# Patient Record
Sex: FEMALE | Race: WHITE
[De-identification: names, ages, dates, MRNs, and addresses within clinical notes are randomized per-mention and may not be internally consistent; named-entity substitution may affect disease eponyms.]

---

## 2018-04-26 ENCOUNTER — HOSPITAL ENCOUNTER (OUTPATIENT)
Dept: HOSPITAL 75 - RAD | Age: 28
End: 2018-04-26
Attending: OBSTETRICS & GYNECOLOGY
Payer: COMMERCIAL

## 2018-04-26 DIAGNOSIS — N83.202: Primary | ICD-10-CM

## 2018-04-26 PROCEDURE — 76830 TRANSVAGINAL US NON-OB: CPT

## 2018-04-26 PROCEDURE — 76856 US EXAM PELVIC COMPLETE: CPT

## 2018-04-26 NOTE — DIAGNOSTIC IMAGING REPORT
INDICATION: Pelvic pain and abnormal uterine bleeding.



TECHNIQUE: Transabdominal and transvaginal pelvic sonography was

performed.



FINDINGS: Uterus measures 7.3 x 4.7 x 4.1 cm. The endometrium is

3 mm in thickness. No uterine mass is seen. The right ovary

measures 2.5 x 2.0 x 1.5 cm. The left ovary measures 3.8 x 3.6 x

2.9 cm. There is a 3.0 cm cyst involving the left ovary. No free

fluid is seen.



IMPRESSION: 3 cm simple left ovarian cyst. The study is otherwise

unremarkable.



Dictated by: 



  Dictated on workstation # [id number]

## 2019-01-16 ENCOUNTER — HOSPITAL ENCOUNTER (OUTPATIENT)
Dept: HOSPITAL 75 - RAD | Age: 29
End: 2019-01-16
Attending: OBSTETRICS & GYNECOLOGY
Payer: OTHER GOVERNMENT

## 2019-01-16 DIAGNOSIS — N83.202: Primary | ICD-10-CM

## 2019-01-16 PROCEDURE — 76856 US EXAM PELVIC COMPLETE: CPT

## 2019-01-16 PROCEDURE — 76830 TRANSVAGINAL US NON-OB: CPT

## 2019-01-16 NOTE — DIAGNOSTIC IMAGING REPORT
PROCEDURE: US Non-ob pelvis comp/trans.



TECHNIQUE: Multiple realtime grayscale images were obtained of

the pelvis in various projections endovaginally.



Transabdominal imaging was also performed.



INDICATION: Left lower quadrant mass.



COMPARISON: Correlation is made with prior pelvic ultrasound from

04/26/2018.



FINDINGS: The uterus measures 7.7 x 5.2 x 4.5 cm. Endometrium is

3 mm in thickness. The right ovary was not visualized. The left

ovary measures 3.6 x 3.5 x 2.7 cm. There is a cyst in the left

ovary measuring 3.5 x 2.7 x 1.4 cm. Previous ovarian cyst

measured approximately 3.0 x 2.8 cm. No other adnexal mass or

free fluid is seen.



IMPRESSION: Left ovarian cyst, similar to perhaps minimally

larger when compared with exam from April. No new abnormality is

seen.



Dictated by: 



  Dictated on workstation # [id number]

## 2019-02-11 ENCOUNTER — HOSPITAL ENCOUNTER (OUTPATIENT)
Dept: HOSPITAL 75 - PREOP | Age: 29
Discharge: HOME | End: 2019-02-11
Attending: OBSTETRICS & GYNECOLOGY
Payer: OTHER GOVERNMENT

## 2019-02-11 VITALS — BODY MASS INDEX: 34.2 KG/M2 | WEIGHT: 193 LBS | HEIGHT: 63 IN

## 2019-02-11 DIAGNOSIS — Z01.818: Primary | ICD-10-CM

## 2019-02-14 ENCOUNTER — HOSPITAL ENCOUNTER (OUTPATIENT)
Dept: HOSPITAL 75 - SDC | Age: 29
Discharge: HOME | End: 2019-02-14
Attending: OBSTETRICS & GYNECOLOGY
Payer: OTHER GOVERNMENT

## 2019-02-14 VITALS — HEIGHT: 63 IN | BODY MASS INDEX: 34.2 KG/M2 | WEIGHT: 193 LBS

## 2019-02-14 VITALS — DIASTOLIC BLOOD PRESSURE: 73 MMHG | SYSTOLIC BLOOD PRESSURE: 106 MMHG

## 2019-02-14 VITALS — DIASTOLIC BLOOD PRESSURE: 72 MMHG | SYSTOLIC BLOOD PRESSURE: 110 MMHG

## 2019-02-14 VITALS — SYSTOLIC BLOOD PRESSURE: 101 MMHG | DIASTOLIC BLOOD PRESSURE: 70 MMHG

## 2019-02-14 VITALS — SYSTOLIC BLOOD PRESSURE: 109 MMHG | DIASTOLIC BLOOD PRESSURE: 82 MMHG

## 2019-02-14 DIAGNOSIS — N83.292: Primary | ICD-10-CM

## 2019-02-14 DIAGNOSIS — J45.909: ICD-10-CM

## 2019-02-14 DIAGNOSIS — R10.2: ICD-10-CM

## 2019-02-14 DIAGNOSIS — E66.9: ICD-10-CM

## 2019-02-14 DIAGNOSIS — F17.210: ICD-10-CM

## 2019-02-14 LAB
BASOPHILS # BLD AUTO: 0 10^3/UL (ref 0–0.1)
BASOPHILS NFR BLD AUTO: 0 % (ref 0–10)
EOSINOPHIL # BLD AUTO: 0.2 10^3/UL (ref 0–0.3)
EOSINOPHIL NFR BLD AUTO: 2 % (ref 0–10)
ERYTHROCYTE [DISTWIDTH] IN BLOOD BY AUTOMATED COUNT: 12.9 % (ref 10–14.5)
HCT VFR BLD CALC: 39 % (ref 35–52)
HGB BLD-MCNC: 12.9 G/DL (ref 11.5–16)
LYMPHOCYTES # BLD AUTO: 3 X 10^3 (ref 1–4)
LYMPHOCYTES NFR BLD AUTO: 35 % (ref 12–44)
MANUAL DIFFERENTIAL PERFORMED BLD QL: NO
MCH RBC QN AUTO: 32 PG (ref 25–34)
MCHC RBC AUTO-ENTMCNC: 33 G/DL (ref 32–36)
MCV RBC AUTO: 95 FL (ref 80–99)
MONOCYTES # BLD AUTO: 0.6 X 10^3 (ref 0–1)
MONOCYTES NFR BLD AUTO: 7 % (ref 0–12)
NEUTROPHILS # BLD AUTO: 4.8 X 10^3 (ref 1.8–7.8)
NEUTROPHILS NFR BLD AUTO: 56 % (ref 42–75)
PLATELET # BLD: 211 10^3/UL (ref 130–400)
PMV BLD AUTO: 12 FL (ref 7.4–10.4)
WBC # BLD AUTO: 8.6 10^3/UL (ref 4.3–11)

## 2019-02-14 PROCEDURE — 86900 BLOOD TYPING SEROLOGIC ABO: CPT

## 2019-02-14 PROCEDURE — 85025 COMPLETE CBC W/AUTO DIFF WBC: CPT

## 2019-02-14 PROCEDURE — 94664 DEMO&/EVAL PT USE INHALER: CPT

## 2019-02-14 PROCEDURE — 86901 BLOOD TYPING SEROLOGIC RH(D): CPT

## 2019-02-14 PROCEDURE — 84703 CHORIONIC GONADOTROPIN ASSAY: CPT

## 2019-02-14 PROCEDURE — 86850 RBC ANTIBODY SCREEN: CPT

## 2019-02-14 PROCEDURE — 87081 CULTURE SCREEN ONLY: CPT

## 2019-02-14 PROCEDURE — 36415 COLL VENOUS BLD VENIPUNCTURE: CPT

## 2019-02-14 NOTE — XMS REPORT
Continuity of Care Document

 Created on: 2019



ERIC BEAL

External Reference #: [id number]

: 1990

Sex: Female



Demographics







 Address  [address]  [address]

 

 Home Phone  [phone] x

 

 Preferred Language  Unknown

 

 Marital Status  Unknown

 

 Yazidism Affiliation  Unknown

 

 Race  Unknown

 

 Ethnic Group  Unknown





Author







 Author  [facility]

 

 Address  Unknown

 

 Phone  Unavailable



              



Allergies

      





 Active            Description            Code            Type            
Severity            Reaction            Onset            Reported/Identified   
         Relationship to Patient            Clinical Status        

 

 Yes            No Known Drug Allergies            [id number]            Drug 
Allergy            Unknown            N/A                         07/10/2010   
                               



                  



Medications

      



There is no data.                  



Problems

      





 Date Dx Coded            Attending            Type            Code            
Diagnosis            Diagnosed By        

 

 2009                                      620.0            FOLLICLE CYST 
OF OVARY                     

 

 2009                                      625.3            severe 
menstrual pain (dysmenorrhea)                     

 

 2009                                      V72.31            Pelvic Exam (
Internal)                     

 

 2009            AMADOR DO LEROY K                         620.0          
  FOLLICLE CYST OF OVARY                     

 

 2009            AMADOR DO LEROY K                         625.3          
  severe menstrual pain (dysmenorrhea)                     

 

 2009            AMADOR DO, LEROY K                         V72.31         
   Pelvic Exam (Internal)                     

 

 2009            AMADOR DO, LEROY K                         620.0          
  FOLLICLE CYST OF OVARY                     

 

 2009            AMADOR DO, LEROY K                         625.3          
  severe menstrual pain (dysmenorrhea)                     

 

 2009            AMADOR DO, LEROY K                         V72.31         
   Pelvic Exam (Internal)                     

 

 2009            AMADOR DO, LEROY K                         620.0          
  FOLLICLE CYST OF OVARY                     

 

 2009            AMADOR DO, LEROY K                         625.3          
  severe menstrual pain (dysmenorrhea)                     

 

 2009            AMADOR DO, LEROY K                         V72.31         
   Pelvic Exam (Internal)                     

 

 2009                                      784.0            headache     
                

 

 2009            AMADOR DO, LEROY K                         784.0          
  headache                     

 

 2009            AMADOR DO, LEROY K                         784.0          
  headache                     

 

 2009            AMADOR DO, LEROY K                         784.0          
  headache                     

 

 2009                                      462            PHARYNGITIS 
ACUTE                     

 

 2009            AMADOR DO LEROY K                         462            
PHARYNGITIS ACUTE                     

 

 2009            AMADOR DO, LEROY K                         462            
PHARYNGITIS ACUTE                     

 

 2009            AMADOR DO LEROY K                         462            
PHARYNGITIS ACUTE                     

 

 2009                                      461.8            OTHER ACUTE 
SINUSITIS                     

 

 2009                                      787.03            VOMITING 
ALONE                     

 

 2009                                      V72.42            PREGNANCY 
TEST POSITIVE RESULT                     

 

 2009            LEROY AMADOR DO                         461.8          
  OTHER ACUTE SINUSITIS                     

 

 2009            AMADOR RUSSELL LUCIANOA K                         787.03         
   VOMITING ALONE                     

 

 2009            AMADOR LEROY LUCIANO                         V72.42         
   PREGNANCY TEST POSITIVE RESULT                     

 

 2009            LEROY AMADOR DO K                         461.8          
  OTHER ACUTE SINUSITIS                     

 

 2009            LEROY AMADOR DO K                         787.03         
   VOMITING ALONE                     

 

 2009            AMADOR LEROY LUCIANO                         V72.42         
   PREGNANCY TEST POSITIVE RESULT                     

 

 2009            LEROY AMADOR DO K                         461.8          
  OTHER ACUTE SINUSITIS                     

 

 2009            LEROY AMADOR DO K                         787.03         
   VOMITING ALONE                     

 

 2009            AMADOR LEROY LUCIANO                         V72.42         
   PREGNANCY TEST POSITIVE RESULT                     

 

 2009                                      616.10            VAGINITIS   
                  

 

 2009                                      V22.0            Supervision 
Of Normal First Pregnancy                     

 

 2009                                      V74.5            visit for: 
screening exam bact/spirochetal venereal disease                     

 

 2009            LEROY AMADOR DO                         616.10         
   VAGINITIS                     

 

 2009            LEROY AMADOR DO                         V22.0          
  Supervision Of Normal First Pregnancy                     

 

 2009            LEROY AMADOR DO                         V74.5          
  visit for: screening exam bact/spirochetal venereal disease                  
   

 

 2009            LEROY AMADOR DO                         616.10         
   VAGINITIS                     

 

 2009            LEROY AMADOR DO                         V22.0          
  Supervision Of Normal First Pregnancy                     

 

 2009            LEROY AMADOR DO                         V74.5          
  visit for: screening exam bact/spirochetal venereal disease                  
   

 

 2009            LEROY AMADOR DO                         616.10         
   VAGINITIS                     

 

 2009            LEROY AMADOR DO                         V22.0          
  Supervision Of Normal First Pregnancy                     

 

 2009            LEROY AMADOR DO                         V74.5          
  visit for: screening exam bact/spirochetal venereal disease                  
   

 

 2010                                      656.13            RH NEGATIVE 
  RHESUS ISOIMMUNIZATION                     

 

 2010            LEROY AMADOR DO                         656.13         
   RH NEGATIVE   RHESUS ISOIMMUNIZATION                     

 

 2010            LEROY AMADOR DO                         656.13         
   RH NEGATIVE   RHESUS ISOIMMUNIZATION                     

 

 2010            LEROY AMADOR DO K                         656.13         
   RH NEGATIVE   RHESUS ISOIMMUNIZATION                     

 

 2010                                      787.01            nausea with 
vomiting                     

 

 2010                                      787.91            DIARRHEA    
                 

 

 2010            RUSSELL AMADOR DOA K                         787.01         
   nausea with vomiting                     

 

 2010            AMADOR RUSSELL LUCIANOA K                         787.91         
   DIARRHEA                     

 

 2010            AMADOR RUSSELL LUCIANOA K                         787.01         
   nausea with vomiting                     

 

 2010            AMADOR RUSSELL LUCIANOA K                         787.91         
   DIARRHEA                     

 

 2010            AMADOR DO LEROY K                         787.01         
   nausea with vomiting                     

 

 2010            AMADOR RUSSELL LUCIANOA K                         787.91         
   DIARRHEA                     

 

 2010                                      008.8            
GASTROENTERITIS VIRAL                     

 

 2010            RUSSELL AMADOR DOA K                         008.8          
  GASTROENTERITIS VIRAL                     

 

 2010            MARJORIE LUCIANO LEROY K                         008.8          
  GASTROENTERITIS VIRAL                     

 

 2010            RUSSELL AMADOR DOA K                         008.8          
  GASTROENTERITIS VIRAL                     

 

 2010                                      465.9            UPPER 
RESPIRATORY INFECTION                     

 

 2010            RUSSELL AMADOR DOA K                         465.9          
  UPPER RESPIRATORY INFECTION                     

 

 2010            RUSSELL AMADOR DOA K                         465.9          
  UPPER RESPIRATORY INFECTION                     

 

 2010            RUSSELL AMADOR DOA K                         465.9          
  UPPER RESPIRATORY INFECTION                     

 

 2011                                      009.1            COLITIS 
ENTERITIS AND GASTROENTERITIS OF PRESUMED INFECTIOUS ORIGIN                     

 

 2011            RUSSELL AMADOR DOA K                         009.1          
  COLITIS ENTERITIS AND GASTROENTERITIS OF PRESUMED INFECTIOUS ORIGIN          
           

 

 2011            RUSSELL AMADOR DOA K                         009.1          
  COLITIS ENTERITIS AND GASTROENTERITIS OF PRESUMED INFECTIOUS ORIGIN          
           

 

 2011            RUSSELL AMADOR DOA K                         009.1          
  COLITIS ENTERITIS AND GASTROENTERITIS OF PRESUMED INFECTIOUS ORIGIN          
           

 

 2012                                      300.00            anxiety     
                

 

 2012                                      466.0            Acute 
Bronchitis                     

 

 2012                                      V58.69            taking high-
risk medication                     

 

 2012            RUSSELL AMADOR DOA K                         300.00         
   anxiety                     

 

 2012            RUSSELL AMADOR DOA K                         466.0          
  Acute Bronchitis                     

 

 2012            RUSSELL AMADOR DOA K                         V58.69         
   taking high-risk medication                     

 

 2012            RUSSELL AMADOR DOA K                         300.00         
   anxiety                     

 

 2012            MARJORIE LUCIANO LEROY K                         466.0          
  Acute Bronchitis                     

 

 2012            RUSSELL AMADOR DOA K                         V58.69         
   taking high-risk medication                     

 

 2012            AMADOR DO, LEROY K                         300.00         
   anxiety                     

 

 2012            AMADOR DO, LEROY K                         466.0          
  Acute Bronchitis                     

 

 2012            AMADOR DO, LEROY K                         V58.69         
   taking high-risk medication                     

 

 2012                                      706.2            Sebaceous 
Cyst                     

 

 2012            AMADOR DO, LEROY K                         706.2          
  Sebaceous Cyst                     

 

 2012            AMADOR DO, LEROY K                         706.2          
  Sebaceous Cyst                     

 

 2012            AMADOR DO, LEROY K                         706.2          
  Sebaceous Cyst                     

 

 2012                                      477.9            RHINITIS     
                

 

 2012            AMADOR DO, LEROY K                         477.9          
  RHINITIS                     

 

 2012            AMADOR DO, LEROY K                         477.9          
  RHINITIS                     

 

 2012            AMADOR DO, LEROY K                         477.9          
  RHINITIS                     

 

 2012                                      466.0            BRONCHITIS, 
ACUTE                     

 

 2012                                      786.2            cough        
             

 

 2012                                      786.50            CHEST PAIN  
                   

 

 2012                                      V65.42            COUNSELING - 
SMOKING CESSATION                     

 

 2012            AMADOR DO, LEROY K                         466.0          
  BRONCHITIS, ACUTE                     

 

 2012            AMADOR DO, LEROY K                         786.2          
  cough                     

 

 2012            AMADOR DO, LEROY K                         786.50         
   CHEST PAIN                     

 

 2012            AMADOR DO, LEROY K                         V65.42         
   COUNSELING - SMOKING CESSATION                     

 

 2012            AMADOR DO, LEROY K                         466.0          
  BRONCHITIS, ACUTE                     

 

 2012            AMADOR DO, LEROY K                         786.2          
  cough                     

 

 2012            AMADOR DO, LEROY K                         786.50         
   CHEST PAIN                     

 

 2012            AMADOR DO, LEROY K                         V65.42         
   COUNSELING - SMOKING CESSATION                     

 

 2012            AMADOR DO, LEROY K                         466.0          
  BRONCHITIS, ACUTE                     

 

 2012            AMADOR DO, LEROY K                         786.2          
  cough                     

 

 2012            AMADOR DO, LEROY K                         786.50         
   CHEST PAIN                     

 

 2012            AMADOR DO, LEROY K                         V65.42         
   COUNSELING - SMOKING CESSATION                     

 

 10/11/2012                                      724.5            BACKACHE     
                

 

 10/11/2012            AMADOR DO, LEROY K                         724.5          
  BACKACHE                     

 

 10/11/2012            AMADOR DO, LEROY K                         724.5          
  BACKACHE                     

 

 10/11/2012            AMADOR DO, LEROY K                         724.5          
  BACKACHE                     

 

 2013            AMADOR DO, LEROY K                         530.81         
   GERD                     

 

 2013            AMADOR DO, LEROY K                         530.81         
   GERD                     

 

 2013            LEROY AMADOR DO K                         530.81         
   GERD                     

 

 2013            LEROY AMADOR DO K                         789.01         
   ABDOMINAL PAIN RIGHT UPPER QUADRANT                     

 

 2013            LEROY AMADOR DO K                         789.01         
   ABDOMINAL PAIN RIGHT UPPER QUADRANT                     

 

 2013            LEROY AMADOR DO K                         789.01         
   ABDOMINAL PAIN RIGHT UPPER QUADRANT                     

 

 2013            LEROY AMADOR DO K                         709.9          
  UNSPECIFIED DISORDER OF SKIN AND SUBCUTANEOUS TISSUE                     

 

 2013            LEROY AMADOR DO K                         709.9          
  UNSPECIFIED DISORDER OF SKIN AND SUBCUTANEOUS TISSUE                     

 

 2014            RAHUL EAGLE APRN            Ot            850.0      
      CONCUSSION W/O COMA                     

 

 2014            RAHUL EAGLE APRN            Ot            873.42     
       OPEN WOUND OF FOREHEAD                     

 

 2014            RAHUL EAGLE APRN            Ot            E000.8     
       OTHER EXTERNAL CAUSE STATUS                     

 

 2014            RAHUL EAGLE APRN            Ot            E007.3     
       ACTIVITIES INVOLVING BASEBALL                     

 

 2014            RAHUL EAGLE APRN            Ot            E849.4     
       ACCID IN RECREATION AREA                     

 

 2014            RAHUL EAGLE APRN            Ot            E917.0     
       STRUCK IN SPORTS                     

 

 2015            FENMOHIT DORAUL S            Ot            623.5     
                             

 

 2015            FENECH DORAUL            Ot            644.03    
                              

 

 2015            FENECH DO, RAUL S            Ot            654.73    
                              

 

 02/10/2015            ZACKERY AL, LIZZY REBOLLEDO            Ot            V22.1      
                            

 

 02/10/2015            FENECH DORAUL            Ot            276.51    
                              

 

 02/10/2015            FENECH DORAUL            Ot            644.03    
                              

 

 02/10/2015            FENECH DO, RAUL S            Ot            646.83    
                              

 

 2015            FENECH DO, RAUL S            Ot            644.21    
                              

 

 2015            FENECH DO, RAUL S            Ot            654.21    
                              

 

 2015            FENECH DO, RAUL S            Ot            663.31    
                              

 

 2015            FENECH DO, RAUL S            Ot            V27.0     
                             

 

 2015            ZACKERY AL, LIZZY REBOLLEDO            Ot            V22.1      
                            

 

 2015            LIZZY VIZCARRA MD            Ot            V22.1      
                            

 

 2018                         Ot            645.23            PRLONGED 
PREG, ANTEPARTUM CONDITION OR C                     

 

 2018                         Ot            V72.63            PRE-
PROCEDURAL LABORATORY EXAMINATION                     

 

 2018                         Ot            V74.8            SCREEN-
BACTERIAL DIS NEC                     

 

 2018                         Ot            644.13            THREAT 
LABOR NEC-ANTEPAR                     

 

 2018            RAUL BOND DO            Ot            N83.202   
         UNSPECIFIED OVARIAN CYST, LEFT SIDE                     

 

 2018                         Ot            645.23            PRLONGED 
PREG, ANTEPARTUM CONDITION OR C                     

 

 2018                         Ot            V72.63            PRE-
PROCEDURAL LABORATORY EXAMINATION                     

 

 2018                         Ot            V74.8            SCREEN-
BACTERIAL DIS NEC                     

 

 2018                         Ot            645.23            PRLONGED 
PREG, ANTEPARTUM CONDITION OR C                     

 

 2018                         Ot            V72.63            PRE-
PROCEDURAL LABORATORY EXAMINATION                     

 

 2018                         Ot            V74.8            SCREEN-
BACTERIAL DIS NEC                     

 

 2018                         Ot            644.13            THREAT 
LABOR NEC-ANTEPAR                     

 

 2018                         Ot            644.13            THREAT 
LABOR NEC-ANTEPAR                     

 

 2019                         Ot            645.23            PRLONGED 
PREG, ANTEPARTUM CONDITION OR C                     

 

 2019                         Ot            V72.63            PRE-
PROCEDURAL LABORATORY EXAMINATION                     

 

 2019                         Ot            V74.8            SCREEN-
BACTERIAL DIS NEC                     

 

 01/15/2019            RAUL BOND DO            Ot            N83.202   
         UNSPECIFIED OVARIAN CYST, LEFT SIDE                     

 

 2019            ISHMAEL AL, BRIGIDO BROWN            Ot            N83.202       
     UNSPECIFIED OVARIAN CYST, LEFT SIDE                     

 

 2019            RONDA LUCIANO RAUL S            Ot            Z01.818   
         ENCOUNTER FOR OTHER PREPROCEDURAL EXAMIN                     



                                                                               
                                                                               
                                                                               
                                                                               
        



Procedures

      





 Code            Description            Performed By            Performed On   
     

 

             [id number]                                  HIDA SCAN                  
                 2013        

 

             [id number]                                  STREP A  (IN-HOUSE)        
                           03/15/2014        



                    



Results

      



There is no data.              



Encounters

      





 ACCT No.            Visit Date/Time            Discharge            Status    
        Pt. Type            Provider            Facility            Loc./Unit  
          Complaint        

 

 [id number]            03/15/2014 10:34:00            03/15/2014 23:59:59          
  CLS            Outpatient            LEROY AMADOR DO                        
                       

 

 [id number]            2013 07:46:00            2013 23:59:59          
  CLS            Outpatient            LEROY AMADOR DO                        
                       

 

 [id number]            2013 16:11:00            2013 23:59:59          
  CLS            Outpatient            LEROY AMADOR DO                        
                       

 

 [id number]            2013 09:15:00            2013 23:59:59          
  CLS            Outpatient                                                    
        

 

 [id number]            2015 15:48:41                         [facility]            
Document Registration                                                          
  

 

 [id number]            2019 05:38:00            2019 13:00:00    
        DIS            Outpatient            RAUL BOND DO            
[facility]            PREOP            CPP, LEFT OVARIAN 
CYST        

 

 [id number]            2019 13:36:00            2019 23:59:59    
        CLS            Outpatient            BRIGIDO QUIÑONES MD            [facility] 
[facility]            RAD            LLQ MASS        

 

 [id number]            2018 09:46:00            2018 23:59:59    
        CLS            Outpatient            RAUL BOND DO            
[facility]            RAD            N93.9 ABNORMAL 
UTERINE BLEEDING        

 

 [id number]            2015 17:54:00            2015 14:55:00    
        DIS            Inpatient            RAUL BOND DO            [facility]            LDRP                     

 

 [id number]            02/10/2015 20:40:00            02/10/2015 21:00:00    
        DIS            Outpatient            RAUL BOND DO            
[facility]            WSo                     

 

 [id number]            2015 19:12:00            2015 21:18:00    
        DIS            Outpatient            RAUL BOND DO            
[facility]            WSo                     

 

 [id number]            2014 09:57:00            2014 23:59:59    
        CLS            Outpatient            ZACKERY AL, LIZZY REBOLLEDO            [facility] 
[facility]            RAD                     

 

 [id number]            2014 19:29:00            2014 21:15:00    
        DIS            Emergency            RAHUL EAGLE            [facility] 
[facility]            ER            HEAD INJ,PLAYING SOFTBALL
        

 

 [id number]            10/30/2013 08:56:00            10/30/2013 12:30:00    
        DIS            Outpatient                                              
              

 

 [id number]            10/24/2013 07:17:00            10/24/2013 23:59:59    
        CLS            Outpatient                                              
              

 

 [id number]            2013 11:21:00            2013 23:59:59    
        CLS            Outpatient                                              
              

 

 [id number]            2013 09:29:00            2013 14:28:00    
        DIS            Emergency                                               
             

 

 [id number]            2013 09:34:00            2013 23:59:59    
        CLS            Outpatient                                              
              

 

 [id number]            2019 10:30:00                         PEN       
     Preadmit            RAUL BOND DO            [facility]            CPP/ LEFT OVARIAN CYST        

 

 [id number]            2010 14:18:00                                   
   Document Registration                                                       
     

 

 [id number]            2010 20:50:00                                   
   Document Registration                                                       
     

 

 [id number]            10/05/2017 10:50:00            10/05/2017 23:59:59            
CLS            Outpatient            [place]                    
     [facility]

## 2019-02-14 NOTE — DISCHARGE INST-WOMEN'S SERVICE
Discharge Inst-Women's Serv


Depart Medication/Instructions


New, Converted or Re-Newed RX:  RX on Chart





Consults/Follow Up


Additional Follow Up:  Yes





Activity


Activity:  Activity as Tolerated


Driving Instructions:  No Driving for 1 Week


NO SMOKING:  NO SMOKING


Nothing Inside Vagina:  No Douching, No [place], No Tampons





Diet


Discharge Diet:  No Restrictions


Symptoms to Report to :  Bleeding Excessive, Pain Increased, Fever Over 101 

Degrees F, Vaginal Bleeding Increase, Questions/Concerns


For Any Problems or Questions:  Contact Your Physician





Skin/Wound Care


Infection Signs and Symptoms:  Increased Redness, Foul Odor of Wound, Increased 

Drainage, Skin Itchy or Has a Rash, Increased Swelling, Temperature Above 101  F


Stitches/Staples/Dermabond:  Dermabond, Care of Stitches


Bathing Instructions:  RAUL sOborne DO Feb 14, 2019 09:14

## 2019-02-14 NOTE — OPERATIVE REPORT
DATE OF SERVICE:  2019



PREOPERATIVE DIAGNOSES:

1.  A 28-year-old female with chronic pelvic pain.

2.  Left ovarian cyst.



POSTOPERATIVE DIAGNOSES:

1.  A 28-year-old female with chronic pelvic pain.

2.  Left ovarian cyst.

3.  Adhesions of the descending and sigmoid colon to the left pelvic sidewall.



PROCEDURE PERFORMED:

Laparoscopic lysis of adhesions with left ovarian cystotomy.



SURGEON:

Raul Bond DO.



ANESTHESIA:

General endotracheal.



ESTIMATED BLOOD LOSS:

Minimal.



URINE OUTPUT:

150 mL, clear at the end of the procedure.



FLUIDS:

800 mL lactated Ringer solution.



FINDINGS:

A small 2 x 3 cm simple fluid-filled cyst of the left ovary.  Filmy adhesions of

the sigmoid colon over the infundibulopelvic ligament and impinging the

infundibulopelvic ligament as well as the fallopian tube beneath the bowel.



SPECIMENS SENT:

None.



INDICATIONS:

This is a 28-year-old female.  The patient had been seen in my office for the

past 5 years.  She had been doing very well until in the past year, started

having ongoing issues with left-sided pelvic pain.  We assumed it was

endometriosis or ovarian cyst formation.  There was identification of ovarian

cyst on the previous ultrasound approximately 6 months ago.  Repeat ultrasound

showed no change of this ovarian cyst despite attempting conservative measures

with oral contraceptive pills suppression therapy.  The patient at this point is

wanting to move forward with more definitive measures and diagnostic measures in

the form of laparoscopy.  We discussed addressing the ovary as well as

evaluating the rest of the pelvic anatomy for any type of pathology that could

be causing her pain.  Risks of procedure were discussed with the patient in

detail including risks of bleeding, infection, damage to surrounding structures

including but not limited to bowel, bladder, ureter, kidneys, possible need for

reoperation.  We discussed recovery time frame, return to work and postoperative

restrictions.  After everything was discussed with the patient, consent was

obtained in the preoperative area and the patient was taken to the operating

room.



OPERATIVE REPORT IN DETAIL:

Once in the operating room, general anesthesia was found to be adequate, placed

in dorsal lithotomy position, prepped and draped in normal sterile fashion.  A

timeout was performed.  A weighted speculum was inserted in the patient's vagina

after Dupree catheter was placed in sterile technique.  A right angle retractor

used to visualize the cervix which was grasped at 12 o'clock position using a

long Allis clamp.  The uterus was then gently sounded using a uterine sound to a

maximum sounding depth of approximately 8 cm.  I then dilated the cervix using

Hegar dilators to allow placement of a Kronner uterine manipulator.  Once this

was in place, I am able to experience and appreciate excellent bimanual

manipulation on exam.  I did remove all the other instruments from the patient's

vagina leaving the Kronner in place and the Dupree in place and performed a

change of gloves, took my attention to the abdomen where supraumbilically

infiltrated this area using 0.25% Marcaine to make an incision through a

previously existing laparoscopy scar using a knife.  This is a 5 mm incision and

directed the Veress needle through the incision until instrument placement was

confirmed using saline drop test.  I then proceeded with insufflation using CO2

gas with an opening pressure of 3 mmHg.  Then, I proceeded to a maximum pressure

of 15 mmHg, at which point I removed the Veress needle and introduced a 5 mm

blunt trocar.  Once this was in place, I am able to confirm intraperitoneal

placement using laparoscope.  I then briefly scanned the upper abdominal anatomy

and able to visualize all my findings as above and they appeared grossly normal

and the upper abdominal anatomy.  I then had the patient placed in steep

Trendelenburg and able to visualize all my findings as described above.  I

placed a second trocar suprapubically through the  scar infiltrating

the skin using 0.25% Marcaine, making a 5 mm incision and directing a trocar

through the incision until intraperitoneal placement was confirmed using the

laparoscope.  Once this was in place, I am able to take down the adhesions

mentioned in my findings above using laparoscopic EndoShears device.  I do not

have to use any monopolar cautery in the dissection process as I do not run into

any bleeding.  Once the adhesions were taken down, I am able to free up the

sigmoid colon from the pelvic sidewall by just blunt traction using a

laparoscopic grasper.  This takes approximately 15-20 minutes of time.  Once

this was done, I then took my attention to the left ovary that was identified

and a cystotomy was performed using the monopolar dany allowing the fluid to

come out of the cyst.  I then copiously irrigated the pelvis with normal saline.

 There was no active bleeding noted from any of my dissection planes.  I

copiously irrigate my dissection on the left pelvic sidewall and there was no

active bleeding noted from any of my dissection planes.  I then took the patient

out of steep Trendelenburg and removed the laparoscopic instruments other than

the camera.  I then released insufflation and removed the suprapubic trocar

under direct visualization of the laparoscope.  The infraumbilical trocar was

left in place to introduce 10 mL of 0.25% Marcaine for postoperative pain

management as well as to release all the remaining insufflation.  I then removed

the trocars as well and the skin reapproximated using Dermabond.  A bandage was

placed over the incision.  The Kronner uterine manipulator was removed as well

as a Dupree catheter.  The patient tolerated the procedure well and sent to

recovery in stable condition.  Lap and sponge counts were correct at the end of

the procedure.  Instrument counts were correct as well.





Job ID: [id number]

DocumentID: [id number]

Dictated Date:  2019 11:06:45

Transcription Date: 2019 14:37:58

Dictated By: RAUL BOND DO

## 2019-02-14 NOTE — XMS REPORT
[facility]

 Created on: 2015



Maninder Roseline

External Reference #: [id number]

: 1990

Sex: Female



Demographics







 Address  [address]  [address]

 

 Home Phone  [phone]

 

 Preferred Language  Unknown

 

 Marital Status  Unknown

 

 Synagogue Affiliation  Unknown

 

 Race  White

 

 Ethnic Group  Not  or 





Author







 NICK Abrams

 

 [facility]  eClinicalWorks

 

 Address  Unknown

 

 Phone  Unavailable







Care Team Providers







 Care Team Member Name  Role  Phone

 

 NICK REEVES  CP  Unavailable



                                                                



Allergies, Adverse Reactions, Alerts

          





 Substance  Reaction  Event Type

 

 N.K.D.A.  Info Not Available  Non Drug Allergy



                                                                               
         



Problems

          





 Problem Type  Condition  ICD-9 Code  Onset Dates  Condition Status

 

 Problem  Anxiety state, unspecified  300.00     Active

 

 Problem  Acute bronchitis  466.0     Active

 

 Problem  Encounter for long-term (current) use of other medications  V58.69   
  Active

 

 Problem  Encounter for smoking cessation counseling  V65.42     Active

 

 Problem  Contact dermatitis  692.9     Active

 

 Problem  Fatigue  780.79     Active

 

 Problem  Allergic rhinitis, cause unspecified  477.9     Active

 

 Problem  Unspecified backache  724.5     Active

 

 Problem  Other acariasis  133.8     Active

 

 Problem  Sebaceous cyst  706.2     Active

 

 Assessment  Encounter for smoking cessation counseling  V65.42     Active

 

 Problem  Cough  786.2     Active

 

 Problem  Unspecified disorder of skin and subcutaneous tissue  709.9     Active

 

 Assessment  Fatigue  780.79     Active

 

 Problem  Abdominal pain, right upper quadrant  789.01     Active

 

 Problem  Chest pain, unspecified  786.50     Active

 

 Problem  Esophageal reflux  530.81     Active



                                                                               
                                                                               
                                                                               
           



Medications

          





 Medication  Code System  Code  Instructions  Start Date  End Date  Status  
Dosage

 

 Wellbutrin SR  NDC  [address]-21  150 MG Orally Twice a day  2015   
     take 1 tab daily x 3 days then bid, stop smoking after 5-7 days of being 
on wellbutrin



                                                                               
         



Procedures

          





 Procedure  Coding System  Code  Date

 

 COMPREHEN METABOLIC PANEL  CPT-4  65602  2015

 

 Office Visit, Est Pt., Level 3  CPT-4  13517  2015

 

 COMPLETE CBC W/AUTO DIFF WBC  CPT-4  24998  2015

 

 VENIPUNCT, ROUTINE*  CPT-4  27351  2015



                                                                               
                                                 



Vital Signs

          





 Date/Time:  2015

 

 Temperature  98.4 F

 

 Weight  204.0 lbs

 

 Height  65 in

 

 BMI  33.94 Index

 

 Blood Pressure Diastolic  60 mmHg

 

 Blood Pressure Systolic  116 mmHg

 

 Cardiac Monitoring Heart Rate  88 bpm



                                                                    



Results

          





 Name  Result  Date  Reference Range  Unit  Abnormality Flag

 

 ROUTINE VENIPUNCTURE               



                                                                    



Summary Purpose

          eClinicalWorks Submission

## 2019-02-14 NOTE — XMS REPORT
[facility]

 Created on: 2015



ManinderSymoneRoseline

External Reference #: [id number]

: 1990

Sex: Female



Demographics







 Address  [address]  [address]

 

 Home Phone  [phone]

 

 Preferred Language  Unknown

 

 Marital Status  Unknown

 

 Orthodox Affiliation  Unknown

 

 Race  White

 

 Ethnic Group  Not  or 





Author







 Author  NICK REEVES

 

 [facility]  eClinicalWorks

 

 Address  Unknown

 

 Phone  Unavailable







Care Team Providers







 Care Team Member Name  Role  Phone

 

 NICK REEVES  CP  Unavailable



                                                                



Allergies, Adverse Reactions, Alerts

          





 Substance  Reaction  Event Type

 

 N.K.D.A.  Info Not Available  Non Drug Allergy



                                                                               
         



Problems

          





 Problem Type  Condition  ICD-9 Code  Onset Dates  Condition Status

 

 Problem  Abdominal pain, right upper quadrant  789.01     Active

 

 Problem  Anxiety state, unspecified  300.00     Active

 

 Problem  Esophageal reflux  530.81     Active

 

 Problem  Other acariasis  133.8     Active

 

 Problem  Sebaceous cyst  706.2     Active

 

 Problem  Contact dermatitis  692.9     Active

 

 Problem  Acute bronchitis  466.0     Active

 

 Problem  Encounter for long-term (current) use of other medications  V58.69   
  Active

 

 Problem  Allergic rhinitis, cause unspecified  477.9     Active

 

 Problem  Unspecified backache  724.5     Active

 

 Problem  Chest pain, unspecified  786.50     Active

 

 Problem  Cough  786.2     Active

 

 Problem  Counseling on substance use and abuse  V65.42     Active

 

 Assessment  Contact dermatitis  692.9     Active

 

 Problem  Unspecified disorder of skin and subcutaneous tissue  709.9     Active



                                                                               
                                                                               
                                                                      



Medications

          





 Medication  Code System  Code  Instructions  Start Date  End Date  Status  
Dosage

 

 Vistaril  NDC  [address]-07  25 MG Orally every 8 hrs  Aug 13, 2015        1 
capsule as needed



                                                                               
         



Procedures

          





 Procedure  Coding System  Code  Date

 

 DEPO MEDROL 40 MG/ML  CPT-4    Aug 27, 2015

 

 THER/PROPH/DIAG INJ, SC/IM  CPT-4  17208  Aug 27, 2015

 

 Office Visit, Est Pt., Level 3  CPT-4  83852  Aug 27, 2015

 

 DEXAMETHASONE 4MG/ML (PER 1 MG)  CPT-4    Aug 27, 2015



                                                                               
                                                 



Vital Signs

          





 Date/Time:  Aug 27, 2015

 

 Temperature  98.5 F

 

 Weight  200.0 lbs

 

 Height  65 in

 

 BMI  33.28 Index

 

 Blood Pressure Diastolic  78 mmHg

 

 Blood Pressure Systolic  134 mmHg

 

 Cardiac Monitoring Heart Rate  80 bpm



                                                                              



Results

          No Known Results                                                     
               



Summary Purpose

          eClinicalWorks Submission

## 2019-02-14 NOTE — XMS REPORT
[facility]

 Created on: 2017



Roseline Dickens

External Reference #: [id number]

: 1990

Sex: Female



Demographics







 Address  [address]  [address]

 

 Preferred Language  Unknown

 

 Marital Status  Unknown

 

 Anabaptism Affiliation  Unknown

 

 Race  Unknown

 

 Ethnic Group  Unknown





Author







 Author  ROSARIO KELSEY

 

 [facility]

 

 Address  [address] [place]  [address]



 

 Phone  [phone]







Care Team Providers







 Care Team Member Name  Role  Phone

 

 ROSARIO KELSEY  Unavailable  [phone]







PROBLEMS







 Type  Condition  ICD9-CM Code  FVH69-EU Code  Onset Dates  Condition Status  
SNOMED Code

 

 Problem  Unspecified backache  724.5        Active  684321299

 

 Problem  Acute bronchitis  466.0        Active  86573978

 

 Problem  Esophageal reflux  530.81        Active  850366538

 

 Problem  Fatigue  780.79        Active  71414353

 

 Problem  Encounter for smoking cessation counseling  V65.42        Active  
96956230

 

 Problem  Anxiety state, unspecified  300.00        Active  465905684

 

 Problem  Allergic rhinitis, cause unspecified  477.9        Active  80969525

 

 Problem  Contact dermatitis  692.9        Active  27009129

 

 Problem  Other acariasis  133.8        Active  234615293

 

 Problem  Chest pain, unspecified  786.50        Active  29258739

 

 Problem  Cough  786.2        Active  37922100

 

 Problem  Encounter for long-term (current) use of other medications  V58.69   
     Active  396697619

 

 Problem  Unspecified disorder of skin and subcutaneous tissue  709.9        
Active  96441521

 

 Problem  Abdominal pain, right upper quadrant  789.01        Active  845348382

 

 Problem  Sebaceous cyst  706.2        Active  193778342







ALLERGIES

No Known Allergies



SOCIAL HISTORY

Never Assessed



PLAN OF CARE







 Activity  Details









  









 Follow Up  prn Reason:







VITAL SIGNS







 Height  65 in  2017

 

 Weight  210.8 lbs  2017

 

 Temperature  98.3 degrees Fahrenheit  2017

 

 Heart Rate  88 bpm  2017

 

 Respiratory Rate  20   2017

 

 BMI  35.08 kg/m2  2017

 

 Blood pressure systolic  110 mmHg  2017

 

 Blood pressure diastolic  74 mmHg  2017







MEDICATIONS

No Known Medications



RESULTS







 Name  Result  Date  Reference Range

 

 INFLUENZA A & B (IN HOUSE)     2017   

 

 INFLUENZA A  negative      

 

 INFLUENZA B  negative      

 

 Control  +      

 

 Lot #  [id number]      

 

 Exp date  18      







PROCEDURES







 Procedure  Date Ordered  Result  Body Site

 

 INFLUENZA ASSAY W/OPTIC  2017      







IMMUNIZATIONS

No Known Immunizations



MEDICAL (GENERAL) HISTORY







 Type  Description  Date

 

 Medical History  acid reflux   

 

 Medical History  asthma   

 

 Surgical History   section x2

## 2019-02-14 NOTE — XMS REPORT
[facility]

 Created on: 2016



ManinderSymoneRoseline

External Reference #: [id number]

: 1990

Sex: Female



Demographics







 Address  [address] [place]  [address]

 

 Home Phone  [phone]

 

 Preferred Language  Unknown

 

 Marital Status  Unknown

 

 Tenriism Affiliation  Unknown

 

 Race  White

 

 Ethnic Group  Not  or 





Author







 GULSHAN Bejarano

 

 Organization  eClinicalWorks

 

 Address  Unknown

 

 Phone  Unavailable







Care Team Providers







 Care Team Member Name  Role  Phone

 

 GULSHAN ARNOLD  CP  Unavailable



                                                                



Allergies, Adverse Reactions, Alerts

          





 Substance  Reaction  Event Type

 

 N.K.D.A.  Info Not Available  Non Drug Allergy



                                                                               
         



Problems

          





 Problem Type  Condition  Code  Onset Dates  Condition Status

 

 Problem  Anxiety state, unspecified  300.00     Active

 

 Problem  Acute bronchitis  466.0     Active

 

 Problem  Encounter for long-term (current) use of other medications  V58.69   
  Active

 

 Problem  Encounter for smoking cessation counseling  V65.42     Active

 

 Problem  Contact dermatitis  692.9     Active

 

 Problem  Fatigue  780.79     Active

 

 Problem  Allergic rhinitis, cause unspecified  477.9     Active

 

 Problem  Unspecified backache  724.5     Active

 

 Problem  Other acariasis  133.8     Active

 

 Problem  Sebaceous cyst  706.2     Active

 

 Assessment  Right wrist pain  M25.531     Active

 

 Problem  Cough  786.2     Active

 

 Problem  Unspecified disorder of skin and subcutaneous tissue  709.9     Active

 

 Assessment  Wrist injury, right, initial encounter  S69.91XA     Active

 

 Problem  Abdominal pain, right upper quadrant  789.01     Active

 

 Problem  Chest pain, unspecified  786.50     Active

 

 Problem  Esophageal reflux  530.81     Active



                                                                               
                                                                               
                                                                               
           



Medications

          





 Medication  Code System  Code  Instructions  Start Date  End Date  Status  
Dosage

 

 Hydrocodone-Acetaminophen  NDC  [address]-03  5-325 MG Orally every 6 hrs  
     1 tablet by Oral route every 6 hours PRN pain

 

 Wellbutrin SR  NDC  [address]-96  150 MG Orally Twice a day  2015   
     take 1 tab daily x 3 days then bid, stop smoking after 5-7 days of being 
on wellbutrin



                                                                               
                   



Procedures

          





 Procedure  Coding System  Code  Date

 

 Office Visit, Est Pt., Level 3  CPT-4  43414  2016

 

 X-RAY EXAM OF WRIST  CPT-4  70996  2016



                                                                               
                             



Vital Signs

          





 Date/Time:  2016

 

 Temperature  98.7 F

 

 Weight  209.6 lbs

 

 Height  65 in

 

 BMI  34.88 Index

 

 Blood Pressure Diastolic  68 mmHg

 

 Blood Pressure Systolic  108 mmHg

 

 Cardiac Monitoring Heart Rate  84 bpm



                                                                              



Results

          No Known Results                                                     
               



Summary Purpose

          eClinicalWorks Submission

## 2019-02-14 NOTE — XMS REPORT
[facility]

 Created on: 10/14/2018



[place]one[place]

External Reference #: [id number]

: 1990

Sex: Female



Demographics







 Address  [address]  [address]

 

 Preferred Language  Unknown

 

 Marital Status  Unknown

 

 [employer] Affiliation  Unknown

 

 Race  Unknown

 

 Ethnic Group  Unknown





Author







 Author  KING COLON

 

 [facility]

 

 Address  [address] [place]  [address]



 

 Phone  [phone]







Care Team Providers







 Care Team Member Name  Role  Phone

 

 KING COLON  Unavailable  [phone]







PROBLEMS







 Type  Condition  ICD9-CM Code  XIF18-II Code  Onset Dates  Condition Status  
SNOMED Code

 

 Problem  Unspecified backache  724.5        Active  124954538

 

 Problem  Acute bronchitis  466.0        Active  27968552

 

 Problem  Esophageal reflux  530.81        Active  161563072

 

 Problem  Fatigue  780.79        Active  30610897

 

 Problem  Encounter for smoking cessation counseling  V65.42        Active  
67460842

 

 Problem  Anxiety state, unspecified  300.00        Active  958415599

 

 Problem  Allergic rhinitis, cause unspecified  477.9        Active  83484646

 

 Problem  Contact dermatitis  692.9        Active  58480431

 

 Problem  Other acariasis  133.8        Active  320297736

 

 Problem  Chest pain, unspecified  786.50        Active  27328238

 

 Problem  Cough  786.2        Active  38586534

 

 Problem  Encounter for long-term (current) use of other medications  V58.69   
     Active  184632014

 

 Problem  Unspecified disorder of skin and subcutaneous tissue  709.9        
Active  32795896

 

 Problem  Abdominal pain, right upper quadrant  789.01        Active  545821631

 

 Problem  Sebaceous cyst  706.2        Active  810885203







ALLERGIES

No Information



ENCOUNTERS







 Encounter  Location  Date  Diagnosis

 

 [facility]  [id number] [address]
7800  10 Oct, 2018  Encounter for immunization Z23

 

 [facility]  [address] [facility] [id number] [address]-
9159  05 Oct, 2017  Encounter for immunization Z23

 

 [facility]  [id number] [place] [address]
-[address]    Other viral agents as the cause of diseases classified 
elsewhere B97.89 ; Acute upper respiratory infection, unspecified J06.9 and 
Body aches R52

 

 [facility]  [address], [place] [address]
-[phone]  05 2016  Sore throat J02.9

 

 [facility]  [id number] [place] [address]-
6432  08 Mar, 2016   

 

 [facility]  [id number] [place] [address]
-[phone]  07 Mar, 2016  Contact dermatitis L25.9

 

 [facility]  [id number] [place] [address]-
6992    Contact dermatitis, unspecified contact dermatitis type, 
unspecified trigger L25.9

 

 [facility]  [id number] [place] [address]
-1204    Wrist injury, right, initial encounter S69.91XA and Right 
wrist pain M25.531

 

 [place] [id number] [place] [address]-
8295  14 Sep, 2015  Fatigue 780.79 and Encounter for smoking cessation 
counseling V65.42

 

 [place] [id number] [place] [address]-
5042  27 Aug, 2015  Contact dermatitis 692.9

 

 [place] [id number] [place] [address]-
8894  13 Aug, 2015  Other acariasis 133.8

 

 [place] [address] [place] [address]-
1405     

 

 [place] [id number] [place] [address]-
6337     

 

 [place] [id number] [place] [address]-
9100     

 

 [place] [id number] [place] [address]-
9318     

 

 [facility]  [address] [place] [address]-
6209     

 

 [place] [id number] [address]-
5382  15 Mar, 2014   

 

 [facility]  [address] [id number] [place] [address]-
2684  15 Mar, 2014   

 

 [facility] [place] [facility]  [address] [id number] [place] [address]-
0076  14 2014   

 

 [facility]  [address] [id number] [place] [address]-
9856  14 2014   

 

 [facility] [place] [facility]  [address] [id number] [place] [address]-
7445  24 Dec, 2013   

 

 [facility]  [address] [id number] [place] [address]-
7829  24 Dec, 2013   

 

 [facility]  [address] [id number] [place] [address]-
4508     

 

 [facility] [place] [facility]  [address] [id number] [place] [address]-
1927     

 

 [facility]  [address] [id number] [place] [address]-
3974  07 Oct, 2013   

 

 [facility] [place] [facility]  [address] [id number] [place] [address]-
6879  04 Oct, 2013   

 

 [facility]  [address] [id number] [place] [address]-
5004  23 Sep, 2013   

 

 [facility] [place] [facility]  [address] [id number] [place] [address]-
1293  18 Sep, 2013   

 

 [facility] [place] [facility]  [address] [id number] [place] [address]-
8822     

 

 [facility] [place] [facility]  [address] [id number] [place] [address]-
2721  11 Oct, 2012   

 

 [facility] [place] [facility]  [address] [id number] [place] [address]-
8726  11 Oct, 2012   

 

 [facility] [place] [facility]  [address] [id number] [place] [address]-
2412  27 Sep, 2012   

 

 [facility]  [address] [facility] [id number] [address]-
1656  25 Sep, 2012   

 

 [facility]  [address] [facility] [id number] [address]-
0056  29 Aug, 2012   

 

 [facility]  [address] [facility] [id number] [address]-
2916  11 May, 2012   

 

 [facility]  [address] [facility] [id number] [address]-
2837  13 2012   

 

 [facility]  [address] [facility] [id number], [place] [address]-
1292     

 

 [facility]  [address] [facility] [id number] [address]-
5646     

 

 [facility]  [address] [address]-
4332  13 Mar, 2012   

 

 [facility]  [address], [place] [address]-
3396  13 Mar, 2012   

 

 [facility]  [id number] [address]-
5115  31 Dec, 2011   

 

 [facility]  [address] [id number] [address]-
9191  29 2010   

 

 [facility]  [address] [id number] [address]-
0346  15 2010   

 

 [facility]  [id number], [place] [address]-
3476  11 Mar, 2010   

 

 [facility]  [address] [facility] [id number], [place] [address]
2540     

 

 [facility]  [address] [facility] [id number], [place] [address]-
0746  23 Dec, 2009   

 

 [facility]  [id number] [address]
2546  03 Dec, 2009   







IMMUNIZATIONS







 Vaccine  Route  Administration Date  Status

 

 FLULAVAL QUAD 0.5ML (6 MO & UP)   IM Intramuscular  Oct 10, 2018  
Administered







SOCIAL HISTORY

Never Assessed



REASON FOR VISIT

Flu shot



PLAN OF CARE





VITAL SIGNS





MEDICATIONS

Unknown Medications



RESULTS

No Results



PROCEDURES







 Procedure  Date Ordered  Result  Body Site

 

 FLULAVAL QUAD 0.5ML (6 MO AND UP) 2018  Oct 10, 2018      

 

 SINGLE IMMUNIZATION ADMIN  Oct 10, 2018      







INSTRUCTIONS





MEDICATIONS ADMINISTERED

No Known Medications



MEDICAL (GENERAL) HISTORY







 Type  Description  Date

 

 Medical History  acid reflux   

 

 Medical History  asthma   

 

 Surgical History   section x2

## 2019-02-14 NOTE — ANESTHESIA-GENERAL POST-OP
General


Patient Condition


Mental Status/LOC:  Same as Preop


Cardiovascular:  Satisfactory


Nausea/Vomiting:  Absent


Respiratory:  Satisfactory


Pain:  Controlled


Complications:  Absent





Post Op Complications


Complications


None





Follow Up Care/Instructions


Patient Instructions


None needed.





Anesthesia/Patient Condition


Patient Condition


Patient is doing well, no complaints, stable vital signs, no apparent adverse 

anesthesia problems.   


No complications reported per nursing.











KENYA SALDIVAR CRNA Feb 14, 2019 13:50

## 2019-02-14 NOTE — PROGRESS NOTE-PRE OPERATIVE
Pre-Operative Progress Note


H&P Reviewed


The H&P was reviewed, patient examined and no changes noted.


Date Seen by Provider:  Feb 14, 2019


Time Seen by Provider:  08:40


Date H&P Reviewed:  Feb 14, 2019


Time H&P Reviewed:  08:40


Pre-Operative Diagnosis:  RAUL DOMINGUEZ DO Feb 14, 2019 08:34

## 2020-04-20 ENCOUNTER — HOSPITAL ENCOUNTER (OUTPATIENT)
Dept: HOSPITAL 75 - RAD | Age: 30
End: 2020-04-20
Attending: FAMILY MEDICINE
Payer: OTHER GOVERNMENT

## 2020-04-20 DIAGNOSIS — N83.202: Primary | ICD-10-CM

## 2020-04-20 PROCEDURE — 74176 CT ABD & PELVIS W/O CONTRAST: CPT

## 2020-04-20 NOTE — DIAGNOSTIC IMAGING REPORT
EXAMINATION: CT Abdomen Pelvis without contrast.



TECHNIQUE: Multiple contiguous axial images were obtained through

the abdomen and pelvis without the use of intravenous contrast.

All CT scans use one or more of the following dose optimizing

techniques: automated exposure control, MA and/or KvP adjustment

based on a patient size and exam type, or iterative

reconstruction. 



HISTORY: Left lower quadrantt pain.



COMPARISON: None available.



FINDINGS: 



Limited views of the lower thorax are unremarkable.



The liver is normal without focal lesion. There is no biliary

ductal dilation. Gallbladder is normal.  Pancreas is normal. 

Spleen is normal. Adrenal glands are normal.



The kidneys are normal. There is no hydronephrosis. Urinary

bladder is normal.



Visualized bowel is normal in caliber without obstruction or

inflammation. There are postsurgical changes of appendectomy.

Uterus and ovaries appear normal. No free fluid or air. No

abdominal or pelvic lymphadenopathy. Aorta is normal in caliber

without aneurysm.



There are no suspicious osseus lesions.



IMPRESSION:



1. No acute abnormality in the abdomen or pelvis.



Dictated by: 



  Dictated on workstation # [id number]

## 2020-07-16 ENCOUNTER — HOSPITAL ENCOUNTER (OUTPATIENT)
Dept: HOSPITAL 75 - PREOP | Age: 30
Discharge: HOME | End: 2020-07-16
Attending: OBSTETRICS & GYNECOLOGY
Payer: OTHER GOVERNMENT

## 2020-07-16 VITALS — WEIGHT: 213.41 LBS | HEIGHT: 62.99 IN | BODY MASS INDEX: 37.81 KG/M2

## 2020-07-16 DIAGNOSIS — Z01.812: ICD-10-CM

## 2020-07-16 DIAGNOSIS — N80.9: ICD-10-CM

## 2020-07-16 DIAGNOSIS — Z01.818: Primary | ICD-10-CM

## 2020-07-16 DIAGNOSIS — N94.6: ICD-10-CM

## 2020-07-16 DIAGNOSIS — R49.0: ICD-10-CM

## 2020-07-16 DIAGNOSIS — Z20.828: ICD-10-CM

## 2020-07-16 PROCEDURE — 87635 SARS-COV-2 COVID-19 AMP PRB: CPT

## 2020-07-20 ENCOUNTER — HOSPITAL ENCOUNTER (OUTPATIENT)
Dept: HOSPITAL 75 - SDC | Age: 30
Discharge: HOME | End: 2020-07-20
Attending: OBSTETRICS & GYNECOLOGY
Payer: OTHER GOVERNMENT

## 2020-07-20 VITALS — SYSTOLIC BLOOD PRESSURE: 104 MMHG | DIASTOLIC BLOOD PRESSURE: 66 MMHG

## 2020-07-20 VITALS — DIASTOLIC BLOOD PRESSURE: 62 MMHG | SYSTOLIC BLOOD PRESSURE: 107 MMHG

## 2020-07-20 VITALS — SYSTOLIC BLOOD PRESSURE: 94 MMHG | DIASTOLIC BLOOD PRESSURE: 63 MMHG

## 2020-07-20 VITALS — DIASTOLIC BLOOD PRESSURE: 66 MMHG | SYSTOLIC BLOOD PRESSURE: 102 MMHG

## 2020-07-20 VITALS — DIASTOLIC BLOOD PRESSURE: 72 MMHG | SYSTOLIC BLOOD PRESSURE: 110 MMHG

## 2020-07-20 VITALS — SYSTOLIC BLOOD PRESSURE: 105 MMHG | DIASTOLIC BLOOD PRESSURE: 70 MMHG

## 2020-07-20 VITALS — SYSTOLIC BLOOD PRESSURE: 84 MMHG | DIASTOLIC BLOOD PRESSURE: 55 MMHG

## 2020-07-20 VITALS — DIASTOLIC BLOOD PRESSURE: 67 MMHG | SYSTOLIC BLOOD PRESSURE: 110 MMHG

## 2020-07-20 VITALS — HEIGHT: 62.99 IN | WEIGHT: 213.41 LBS | BODY MASS INDEX: 37.81 KG/M2

## 2020-07-20 VITALS — SYSTOLIC BLOOD PRESSURE: 98 MMHG | DIASTOLIC BLOOD PRESSURE: 54 MMHG

## 2020-07-20 VITALS — DIASTOLIC BLOOD PRESSURE: 68 MMHG | SYSTOLIC BLOOD PRESSURE: 108 MMHG

## 2020-07-20 VITALS — DIASTOLIC BLOOD PRESSURE: 63 MMHG | SYSTOLIC BLOOD PRESSURE: 97 MMHG

## 2020-07-20 VITALS — SYSTOLIC BLOOD PRESSURE: 109 MMHG | DIASTOLIC BLOOD PRESSURE: 68 MMHG

## 2020-07-20 VITALS — SYSTOLIC BLOOD PRESSURE: 106 MMHG | DIASTOLIC BLOOD PRESSURE: 69 MMHG

## 2020-07-20 DIAGNOSIS — N93.9: ICD-10-CM

## 2020-07-20 DIAGNOSIS — N72: ICD-10-CM

## 2020-07-20 DIAGNOSIS — N94.19: ICD-10-CM

## 2020-07-20 DIAGNOSIS — J45.909: ICD-10-CM

## 2020-07-20 DIAGNOSIS — Z80.0: ICD-10-CM

## 2020-07-20 DIAGNOSIS — N80.9: ICD-10-CM

## 2020-07-20 DIAGNOSIS — F17.210: ICD-10-CM

## 2020-07-20 DIAGNOSIS — N94.6: ICD-10-CM

## 2020-07-20 DIAGNOSIS — D25.1: Primary | ICD-10-CM

## 2020-07-20 DIAGNOSIS — Z79.899: ICD-10-CM

## 2020-07-20 DIAGNOSIS — E66.9: ICD-10-CM

## 2020-07-20 DIAGNOSIS — Z80.8: ICD-10-CM

## 2020-07-20 LAB
BASOPHILS # BLD AUTO: 0 10^3/UL (ref 0–0.1)
BASOPHILS NFR BLD AUTO: 0 % (ref 0–10)
EOSINOPHIL # BLD AUTO: 0.2 10^3/UL (ref 0–0.3)
EOSINOPHIL NFR BLD AUTO: 2 % (ref 0–10)
ERYTHROCYTE [DISTWIDTH] IN BLOOD BY AUTOMATED COUNT: 12.8 % (ref 10–14.5)
HCT VFR BLD CALC: 39 % (ref 35–52)
HGB BLD-MCNC: 13.3 G/DL (ref 11.5–16)
LYMPHOCYTES # BLD AUTO: 2.4 X 10^3 (ref 1–4)
LYMPHOCYTES NFR BLD AUTO: 27 % (ref 12–44)
MANUAL DIFFERENTIAL PERFORMED BLD QL: NO
MCH RBC QN AUTO: 32 PG (ref 25–34)
MCHC RBC AUTO-ENTMCNC: 34 G/DL (ref 32–36)
MCV RBC AUTO: 94 FL (ref 80–99)
MONOCYTES # BLD AUTO: 0.7 X 10^3 (ref 0–1)
MONOCYTES NFR BLD AUTO: 8 % (ref 0–12)
NEUTROPHILS # BLD AUTO: 5.7 X 10^3 (ref 1.8–7.8)
NEUTROPHILS NFR BLD AUTO: 63 % (ref 42–75)
PLATELET # BLD: 221 10^3/UL (ref 130–400)
PMV BLD AUTO: 11.8 FL (ref 7.4–10.4)
WBC # BLD AUTO: 9.1 10^3/UL (ref 4.3–11)

## 2020-07-20 PROCEDURE — 86901 BLOOD TYPING SEROLOGIC RH(D): CPT

## 2020-07-20 PROCEDURE — 94664 DEMO&/EVAL PT USE INHALER: CPT

## 2020-07-20 PROCEDURE — 36415 COLL VENOUS BLD VENIPUNCTURE: CPT

## 2020-07-20 PROCEDURE — 87081 CULTURE SCREEN ONLY: CPT

## 2020-07-20 PROCEDURE — 86850 RBC ANTIBODY SCREEN: CPT

## 2020-07-20 PROCEDURE — 88307 TISSUE EXAM BY PATHOLOGIST: CPT

## 2020-07-20 PROCEDURE — 85025 COMPLETE CBC W/AUTO DIFF WBC: CPT

## 2020-07-20 PROCEDURE — 84703 CHORIONIC GONADOTROPIN ASSAY: CPT

## 2020-07-20 PROCEDURE — 86900 BLOOD TYPING SEROLOGIC ABO: CPT

## 2020-07-20 RX ADMIN — SODIUM CHLORIDE, SODIUM LACTATE, POTASSIUM CHLORIDE, AND CALCIUM CHLORIDE SCH MLS/HR: 600; 310; 30; 20 INJECTION, SOLUTION INTRAVENOUS at 11:36

## 2020-07-20 RX ADMIN — ONDANSETRON PRN MG: 2 INJECTION, SOLUTION INTRAMUSCULAR; INTRAVENOUS at 10:28

## 2020-07-20 RX ADMIN — SODIUM CHLORIDE, SODIUM LACTATE, POTASSIUM CHLORIDE, AND CALCIUM CHLORIDE SCH MLS/HR: 600; 310; 30; 20 INJECTION, SOLUTION INTRAVENOUS at 10:31

## 2020-07-20 RX ADMIN — ONDANSETRON PRN MG: 2 INJECTION, SOLUTION INTRAMUSCULAR; INTRAVENOUS at 10:40

## 2020-07-20 NOTE — XMS REPORT
Patient Summarization Differential

                             Created on: 2020



ERIC BEAL

External Reference #: [id number]

: 1990

Sex: Female



Demographics





                          Address                   [address] [place] [address] Phone                [phone]

 

                          Preferred Language        English

 

                          Marital Status            Unknown

 

                          Christian Affiliation     Unknown

 

                          Race                      White

 

                          Ethnic Group              Not  or 





Author





                          Author                    [employer]  [email or web address]

 

                          [facility]              [facility] [employer] [place]

 

                          Address                   [address]  [address]



 

                          Phone                     [phone]







Care Team Providers





                    Care Team Member Name Role                Phone

 

                    GULSHAN ARNOLD       Unavailable         Unavailable

 

                    NICK REEVES    Unavailable         Unavailable

 

                    [facility] Unavailable         [phone]

 

                    DARLENE, KING     Unavailable         [phone]

 

                    Migration, Doctor   Unavailable         Unavailable

 

                    Migration, Doctor   Unavailable         Unavailable

 

                    Migration, Doctor   Unavailable         Unavailable

 

                    Migration, Doctor   Unavailable         Unavailable

 

                    ZACKERY AL, LIZZY REBOLLEDO Unavailable         Unavailable

 

                    DARLENE, KING     Unavailable         [phone]

 

                    Migration, Doctor   Unavailable         Unavailable

 

                    RAUL BOND DO S Unavailable         Unavailable

 

                    ISHMAEL AL, BRIGIDO BROWN    Unavailable         Unavailable

 

                    COURTNEY AL, JN HARRELL  Unavailable         Unavailable

 

                    DARLENE, KING     Unavailable         [phone]

 

                    DARLENE, KING     Unavailable         [phone]

 

                    zzSANCHEZ, CRISTINE    Unavailable         [phone]

 

                    DARLENE, KING     Unavailable         [phone]

 

                    Migration, Doctor   Unavailable         Unavailable

 

                    zzSANCHEZ, CRISTINE    Unavailable         [phone]

 

                          Unavailable               Unavailable

 

                          Unavailable               Unavailable



                                            



Allergies

                      The data below is from unstructured sources            



                    Substance                   Reaction                   Event

 Type               



 

                    N.K.D.A.                   Info Not Available               

    Non Drug Allergy

               



            



                Allergen                   Type                   Severity      

             

Reaction                                Last Updated                

 

                    No Known Drug Allergies                                     

                    

                                                    07/10/10                



            No Information                                                      
             



Encounters

                      



    



              Encounter Date  Encounter Type  Encounter Diagnosis  Care Provider

  Facility

 

    



                 Start:          Patient encounter   RAUL BOND DO  [facility]

a [place]



                     2020          [facility]



                                         

    



                                         End:    



                                         2020    

 

    



                 Start:          Patient encounter   RAUL BOND DO  [facility]

a [place]



                     2020          [facility]

 

    



                 Start:          Patient encounter   RAUL CERRATO NIDIAMOHIT DO  [facility]

a [place]



                     2020          [facility]

 

    



                 Start:          Patient encounter   JN VALDEZ MD  [facility] 

[facility]



                     2020          [facility]

 

    



                 Start:          Encounter for other  RAUL BOND DO  [facility]

 Susan



                     04-          preprocedural       [facility]



                           examination               ([id number])

 

    



                 Start:          Patient encounter   RAUL BOND DO  Not Avai

lable ([id number])



                           2019                procedure   



                                         

    



                                         End:    



                                         2019    

 

    



                 Start:          Patient encounter   RAUL BOND DO  [facility]

a [place]



                     2019          procedure           [facility]



                                         

    



                                         End:    



                                         2019    

 

    



                 Start:          Patient encounter   RAUL BOND DO  Not Avai

lable ([id number])



                           2019                procedure   



                                         

    



                                         End:    



                                         2019    

 

    



                 Start:          Patient encounter   RAUL BOND DO  [facility]

a [place]



                     2019          procedure           [facility]



                                         

    



                                         End:    



                                         2019    

 

    



                 Start:          Patient encounter   BRIGIDO QUIÑONES MD   Not Availab

le ([id number])



                           2019                procedure   

 

    



                 Start:          Patient encounter   BRIGIDO QUIÑONES MD  [facility] 

risti



                     2019          procedure           [facility]

 

    



              Start:       [facility]  Encounter for  [place] 

 [facility]



                           10-                immunization  



                                         

    



                                         End:    



                                         10-    

 

    



                 Start:          Patient encounter   RAUL BOND DO  Not Avai

lable ([id number])



                           2018                procedure   

 

    



                 Start:          Patient encounter   RAUL BOND DO  [facility]

a [place]



                     2018          procedure           [facility]

 

    



              Start:       [facility]  Encounter for  LEROY AMADOR   [facility]



                           10-                immunization  



                                         

    



                                         End:    



                                         10-    

 

    



              Start:       [facility]  Other viral agents as  [facility]



                 2017      CARE            the cause of diseases   CARE



                                         

                                         classified elsewhere  



                                         End:    



                                         2017    

 

    



              Start:       [facility]  Acute pharyngitis,  RONY zzSTAH

[facility]



                 2016      CARE            unspecified     CARE



                                         

    



                                         End:    



                                         2016    

 

    



                 Start:          Telephone encounter   [place]  [facility]



                                         2016    



                                         

    



                                         End:    



                                         2016    

 

    



              Start:       [facility]  Unspecified contact  LAVERN DUQUE  [facility]



                 2016      CARE            dermatitis,     CARE



                                         

                                         unspecified cause  



                                         End:    



                                         2016    

 

    



              Start:       [facility]  Unspecified contact  GULSHAN ARNOLD  [facility]



                           2016                dermatitis,  



                                         

                                         unspecified cause  



                                         End:    



                                         2016    

 

    



              Start:       [facility]  Unspecified injury of  GULSHAN ARNOLD  [facility]

[facility]



                 2016      CARE            right wrist, hand and   CARE



                                         

                                         finger(s), initial  



                           End:                      encounter  



                                         2016    

 

    



              Start:       [facility]  Other malaise and  NICKAVA ZIEGLER  [facility]



                           2015                fatigue  



                                         

    



                                         End:    



                                         2015    

 

    



              Start:       [facility]  Contact dermatitis  NICK V

ALDNOLA  [facility]



                           2015                and other eczema,  



                                         

                                         unspecified cause  



                                         End:    



                                         2015    

 

    



              Start:       [facility]  Other acariasis  RACHAEL Rao

NDEADALBERTO  [facility]



                                         2015    



                                         

    



                                         End:    



                                         2015    

 

    



                 Start:          Telephone encounter   Doctor Migration  [facility]



                                         2015    



                                         

    



                                         End:    



                                         2015    

 

    



                 Start:          Telephone encounter   Doctor Migration  [facility]



                                         2015    



                                         

    



                                         End:    



                                         2015    

 

    



                 Start:          [facility]   Doctor Migration  [facility]



                                         2014    



                                         

    



                                         End:    



                                         2014    

 

    



                 Start:          Telephone encounter   Doctor Migration  [facility]



                                         2014    



                                         

    



                                         End:    



                                         2014    

 

    



                 Start:          Emergency department   RAHUL mcrae ([id number])



                           2014                patient visit   



                                         

    



                                         End:    



                                         2014    

 

    



                 Start:          [facility]   [place]  [facility]



                                         03-    



                                         

    



                                         End:    



                                         03-    

 

    



                 Start:          [facility]   Doctor Migration  [facility]



                                         2014    



                                         

    



                                         End:    



                                         2014    

 

    



                 Start:          [facility]   Doctor Migration  [facility]



                                         2013    



                                         

    



                                         End:    



                                         2013    

 

    



                 Start:          [facility]   Doctor Migration  [facility]



                                         2013    



                                         

    



                                         End:    



                                         2013    

 

    



                 Start:          [facility]   [place]  [facility]



                                         10-    



                                         

    



                                         End:    



                                         10-    

 

    



                 Start:          [facility]   [place]  [facility]



                                         10-    



                                         

    



                                         End:    



                                         10-    

 

    



                 Start:          [facility]   [place]  [facility]



                                         2013    



                                         

    



                                         End:    



                                         2013    

 

    



                 Start:          [facility]   CRISTINE RaoNCZANDRA  [facility]



                                         2013    



                                         

    



                                         End:    



                                         2013    

 

    



                 Start:          [facility]   Doctor Migration  [facility]



                                         2013    



                                         

    



                                         End:    



                                         2013    

 

    



                 Start:          [facility]   Doctor Migration  [facility]



                                         10-    



                                         

    



                                         End:    



                                         10-    

 

    



                 Start:          [facility]   Doctor Migration  [facility]



                                         2012    



                                         

    



                                         End:    



                                         2012    

 

    



                 Start:          [facility]   Doctor Migration  [facility]



                                         2012    



                                         

    



                                         End:    



                                         2012    

 

    



                 Start:          Telephone encounter   Doctor Migration  [facility]



                                         2012    



                                         

    



                                         End:    



                                         2012    

 

    



                 Start:          [facility]   IKE ANGULO    [facility]



                                         2012    



                                         

    



                                         End:    



                                         2012    

 

    



                 Start:          [facility]   Doctor Migration  [facility]



                                         2012    



                                         

    



                                         End:    



                                         2012    

 

    



                 Start:          [facility]   Doctor Migration  [facility]



                                         2012    



                                         

    



                                         End:    



                                         2012    

 

    



                 Start:          [facility]   Doctor Migration  [facility]



                                         2012    



                                         

    



                                         End:    



                                         2012    

 

    



                 Start:          [facility]   Doctor Migration  [facility]



                                         2012    



                                         

    



                                         End:    



                                         2012    

 

    



                 Start:          [facility]   Doctor Migration  [facility]



                                         2011    



                                         

    



                                         End:    



                                         2011    

 

    



                 Start:          [facility]   Doctor Migration  [facility]



                                         2010    



                                         

    



                                         End:    



                                         2010    

 

    



                           Start:                    Patient encounter   



                           2010                procedure   

 

    



                           Start:                    Patient encounter   



                           2010                procedure   

 

    



                 Start:          Patient encounter   LIZZY VIZCARRA MD  [facility]



                     2010          procedure           [facility]

 

    



                 Start:          Telephone encounter   Doctor Migration  [facility]



                                         04-    



                                         

    



                                         End:    



                                         04-    

 

    



                 Start:          Telephone encounter   Doctor Migration  [facility]



                                         2010    



                                         

    



                                         End:    



                                         2010    

 

    



                 Start:          Telephone encounter   Doctor Migration  [facility]



                                         2010    



                                         

    



                                         End:    



                                         2010    

 

    



                 Start:          [facility]   Doctor Migration  [facility]



                                         2009    



                                         

    



                                         End:    



                                         2009    

 

    



                 Start:          [facility]   Doctor Migration  [facility]



                                         2009    



                                         

    



                                         End:    



                                         2009    

 

    



                     Patient encounter   NA NA               Not Available (0000

0)



                                         procedure   

 

    



                           PRE-PROCEDURAL            Pre-procedural  



                           LABORATORY                laboratory  



                           EXAMINATION               examination  

 

    



                     Encounter for katiuska BOND DO   Not Available (000

00)



                                         preprocedural  



                                         examination  



                                                                                
                                                                                
                                                                                
                                                                                
                                                                                
                                                                                
                                                                                
                                                                                
                    



Medical Equipment

          No Information                                                        
 



Goals

          No Information                                                        
 



Immunizations

                      



    



              Immunizatio  Immunization  Notes        Care Provider  Facility



                                         n Date    

 

    



                 10-      influenza, seasonal,   NA NA           [facility]



                           injectable ;              [facility]



                           Translations: [[facility]



                           IMMUNIZATION ADMIN]       (85627)

 

    



                 10-      influenza, injectable,   KING DARLENE  [facility]



                     quadrivalent,       Other Phone:        [facility]



                     preservative free   [phone]       [place] ([id number])

 

    



                 10-      influenza, injectable,   NA NA           Not Av

ailable (92673)



                                         quadrivalent,   



                                         preservative free   



                                                                                
                 



Interventions

          No Information                                                        
 



Medications

                      



    



              Medication   Drug         Dates        Sig          Sig (Original)



                           Class(es)                 (Normalized) 

 

    



                 Albuterol Sulfate 90   Start:          take 2 puff(s)  Albutero

l Sulfate 90 

mcg/actuation 2



                 mcg/actuation   2013      by inhalation   Puffs by Inhala

tion route every 4 

hours



                     (1 source)          every four          Use with chamber  Active



                                         hours 

 

    



              amoxicillin 500 mg oral  Penicillin   Start:       take 1 capsule 

 Amoxicillin 500 mg

1 capsule by Oral



              capsule      -class       03-   by mouth three  route 3 endy

es per day for 10 days 

15



                 (1 source)      Antibacter      times daily     Mar, 2014 Activ

e



                                         ial   

 

    



              cetirizine hydrochloride  Histamine-   Start:       take 1 tablet 

 cetirizine 10 mg 

take 1 tablet (10 mg)



              10 mg oral tablet  1 Receptor   2013   by mouth once  by ora

l route once 

daily 18 Sep, 2013



                 (1 source)      Antagonist      daily           Active

 

    



              codeine phosphate 2  Opioid       Start:       take 5 mL by  Prome

thazine-Codeine 6.25-10

mg/5 mL 5



              mg/ml / promethazine  Agonist,     2012   mouth every  mL by

 Oral route every

4 hours for 7



                 hydrochloride 1.25 mg/ml  Phenothiaz      four hours      day(s

) 25 Sep, 2012 Active



                           oral solution             ine   



                                         (1 source)    

 

    



                 mupirocin 0.02 mg/mg  RNA             Start:          Bactroban

 2 % 1 Application by Topical



                 nasal ointment  Synthetase      2013      route 2 times p

er day for 7 days to



                     (1 source)          Inhibitor           affected area. 24 D

2013 Active



                                         Antibacter   



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

## 2020-07-20 NOTE — NUR
DR. BOND NOTIFIED OF PT'S VS, VOIDING, AMBULATING, AND TOLERATING P.O. AND DESIRE TO GO 
HOME TONIGHT. ORDER RECEIVED FOR DISCHARGE.

## 2020-07-20 NOTE — XMS REPORT
[facility]

                             Created on: 2020



Roseline Dickens

External Reference #: [id number]

: 1990

Sex: Female



Demographics





                          Address                   [address]  [address]

 

                          Preferred Language        Unknown

 

                          Marital Status            Unknown

 

                          Gnosticist Affiliation     Unknown

 

                          Race                      Unknown

 

                          Ethnic Group              Unknown





Author





                          Author                    Roseline COLON

 

                          Organization              [facility]

 

                          Address                   [address] [place]  [address]



 

                          Phone                     [phone]







Care Team Providers





                    Care Team Member Name Role                Phone

 

                    KING COLON    Unavailable         [phone]







PROBLEMS





          Type      Condition ICD9-CM Code QZY98-JQ Code Onset Dates Condition S

tatus SNOMED 

Code

 

          Problem   Abdominal pain, right upper quadrant 789.01                 

       Active    082675938

 

           Problem    Encounter for long-term (current) use of other medications

 V58.69                           

Active                                  [id number]

 

          Problem   Cough     786.2                         Active    11416443

 

          Problem   Chest pain, unspecified 786.50                        Active

    79965099

 

          Problem   Unspecified backache 724.5                         Active   

 875404392

 

          Problem   Esophageal reflux 530.81                        Active    24

6866475

 

          Problem   Acute bronchitis 466.0                         Active    105

45350

 

          Problem   Encounter for smoking cessation counseling V65.42           

             Active    82495122

 

          Problem   Sebaceous cyst 706.2                         Active    [address]39

3000

 

          Problem   Fatigue   780.79                        Active    17140658

 

          Problem   Unspecified disorder of skin and subcutaneous tissue 709.9  

                       Active    

71274115

 

          Problem   Allergic rhinitis, cause unspecified 477.9                  

       Active    82636727

 

          Problem   Anxiety state, unspecified 300.00                        Act

kishor    [id number]

 

          Problem   Other acariasis 133.8                         Active    1872

34673

 

          Problem   Contact dermatitis 692.9                         Active    4

[id number]







ALLERGIES

No Information



ENCOUNTERS





                Encounter       Location        Date            Diagnosis

 

                          [facility]     [address] [facility] [id number]

[address] [place] [address]

                          10 Oct, 2018              Encounter for immunization Z

23

 

                          [facility]     [address] [facility] [id number]

[address] [place] [address]

                          05 Oct, 2017              Encounter for immunization Z

23

 

                          [facility]  [address] [facility] [id number]

[address] [place] [address] 

[address]                              Other viral agents as the [place]

use of diseases classified 

elsewhere B97.89 ; Acute upper respiratory infection, unspecified J06.9 and Body
aches R52

 

                          [facility]  [address] [facility] [id number]

[address] [place] [address] 

[address]                05 2016              Sore throat J02.9

 

                          [facility]     [address] [facility] [id number]

[address] [place] [address]

                          08 Mar, 2016               

 

                          [facility]  [address] [place] [id number] [place] [address] 

[address]                07 Mar, 2016              Contact dermatitis L25.9

 

                          [facility]     [address] [id number]

[address] [place] [address]

                                        Contact dermatitis, unspecif

ied contact dermatitis type, 

unspecified trigger L25.9

 

                          [facility]  [address] [facility] [id number] [place] [address] 

[address]                              Wrist injury, right, initial

 encounter S69.91XA and 

Right wrist pain M25.531

 

                          [place] [address] [address]

                          14 Sep, 2015              Fatigue 780.79 and Encounter

 for smoking cessation counseling 

V65.42

 

                          [place] [address] [address]

                          27 Aug, 2015              Contact dermatitis 692.9

 

                          [place] [address] [place] [id number]

[address] [place] [address]

                          13 Aug, 2015              Other acariasis 133.8

 

                          [place] [id number]

[address] [place] [address]

                          14 2015               

 

                          [place] [address] [place] [id number]

[address] [place] [address]

                                         

 

                          [place] [address] [place] [id number]

[address] [place] [address]

                                         

 

                          [facility]     [address] [id number]

[address] [place] [address]

                                         

 

                          [place] [address] [place] [id number]

[address] [place] [address]

                          13 2014               

 

                          [facility]     [address] [id number]

[address] [place] [address], [place] [address]

                          15 Mar, 2014               

 

                          [facility]     [address] [id number]

[address] [place] [address], [place] [address]

                          15 Mar, 2014               

 

                          [facility]     [address] [id number]

[address] [place] [address], [place] [address]

                          14 2014               

 

                          [facility]     [address] [id number]

[address] [place] [address], [place] [address]

                                         

 

                          [facility]     [address] [id number]

[address] [place] [address], [place] [address]

                          24 Dec, 2013               

 

                          [facility]     [address] [id number]

[address] [place] [address], [place] [address]

                          24 Dec, 2013               

 

                          [facility]     [address] [id number]

[address] [place] [address], [place] [address]

                                         

 

                          [facility]     [address] [id number]

[address] [place] [address]

                                         

 

                          [facility]     [address] [id number]

[address] [place] [address], [place] [address]

                          07 Oct, 2013               

 

                          [facility]     [address] [id number]

[address] [place] [address]

                          04 Oct, 2013               

 

                          [facility]     [address] [id number]

[address] [place] [address]

                          23 Sep, 2013               

 

                          [facility]     [address] [id number]

[address] [place] [address]

                          18 Sep, 2013               

 

                          [facility]     [address] [id number]

[address] [place] [address], [place] [address]

                                         

 

                          [facility]     [address] [id number]

[address] [place] [address]

                          11 Oct, 2012               

 

                          [facility]     [address] [id number]

[address] [place] [address]

                          11 Oct, 2012               

 

                          [facility]     [address] [id number]

[address] [place] [address]

                          27 Sep, 2012               

 

                          [facility]     [address] [id number]

[address] [place] [address], [place] [address]

                          25 Sep, 2012               

 

                          [facility]     [address] [id number]

[address] [place] [address]

                          29 Aug, 2012               

 

                          [facility]     [address] [id number]

[address] [place] [address]

                          11 May, 2012               

 

                          [facility]     [address] [id number]

[address] [place] [address]

                          13 2012               

 

                          [facility]     [address] [id number]

[address] [place] [address]

                                         

 

                          [facility]     [address] [id number]

[address] [place] [address]

                                         

 

                          [facility]     [address] [id number]

[address] [place] [address]

                          13 Mar, 2012               

 

                          [facility]     [address] [id number]

[address] [place] [address]

                          13 Mar, 2012               

 

                          [facility]     [address] [id number]

[address] [place] [address]

                          31 Dec, 2011               

 

                          [facility]     [address] [id number]

[address] [place] [address]

                          29 2010               

 

                          [facility]     [address] [id number]

[address] [place] [address]

                          15 2010               

 

                          [facility]     [address] [id number]

[address] [place] [address]

                          11 Mar, 2010               

 

                          [facility]     [address] [id number]

[address] [place] [address]

                          13 2010               

 

                          [facility]     [address] [id number]

[address] [place] [address]

                          23 Dec, 2009               

 

                          [facility]     [address] [id number]

[address] [place] [address]

                          03 Dec, 2009               







IMMUNIZATIONS

No Known Immunizations



SOCIAL HISTORY

Never Assessed



REASON FOR VISIT





PLAN OF CARE





VITAL SIGNS





MEDICATIONS

Unknown Medications



RESULTS

No Results



PROCEDURES

No Known procedures



INSTRUCTIONS





MEDICATIONS ADMINISTERED

No Known Medications



MEDICAL (GENERAL) HISTORY





                    Type                Description         Date

 

                    Medical History     acid reflux          

 

                    Medical History     asthma               

 

                    Surgical History     section x2

## 2020-07-20 NOTE — XMS REPORT
[facility]

                             Created on: 10/08/2019



Roseline Dickens

External Reference #: [id number]

: 1990

Sex: Female



Demographics





                          Address                   [address]  [address]

 

                          Preferred Language        Unknown

 

                          Marital Status            Unknown

 

                          Mormon Affiliation     Unknown

 

                          Race                      Unknown

 

                          Ethnic Group              Unknown





Author





                          Author                    Roseline Castañeda Doctor

 

                          Organization              [facility]

 

                          Address                   Unknown

 

                          Phone                     Unavailable







Care Team Providers





                    Care Team Member Name Role                Phone

 

                    Migration,  Doctor  Unavailable         Unavailable







PROBLEMS





          Type      Condition ICD9-CM Code OOE44-YD Code Onset Dates Condition S

tatus SNOMED 

Code

 

          Problem   Abdominal pain, right upper quadrant 789.01                 

       Active    987084276

 

           Problem    Encounter for long-term (current) use of other medications

 V58.69                           

Active                                  [id number]

 

          Problem   Cough     786.2                         Active    89362632

 

          Problem   Chest pain, unspecified 786.50                        Active

    43831535

 

          Problem   Unspecified backache 724.5                         Active   

 699040051

 

          Problem   Esophageal reflux 530.81                        Active    24

7709347

 

          Problem   Acute bronchitis 466.0                         Active    105

40596

 

          Problem   Encounter for smoking cessation counseling V65.42           

             Active    57665851

 

          Problem   Sebaceous cyst 706.2                         Active    09777

3000

 

          Problem   Fatigue   780.79                        Active    82232139

 

          Problem   Unspecified disorder of skin and subcutaneous tissue 709.9  

                       Active    

46578310

 

          Problem   Allergic rhinitis, cause unspecified 477.9                  

       Active    51236052

 

          Problem   Anxiety state, unspecified 300.00                        Act

kishor    177732520

 

          Problem   Other acariasis 133.8                         Active    1872

58388

 

          Problem   Contact dermatitis 692.9                         Active    4

[id number]







ALLERGIES

No Information



ENCOUNTERS





                Encounter       Location        Date            Diagnosis

 

                          [facility]     [address] [id number]

[address] [place] [address]

                          10 Oct, 2018              Encounter for immunization Z

23

 

                          [facility]     [address] [id number]

[address] [place] [address]

                          05 Oct, 2017              Encounter for immunization Z

23

 

                          [facility]  [address] [id number]

[address] [place] [address] 

[phone]                              Other viral agents as the ca

use of diseases classified 

elsewhere B97.89 ; Acute upper respiratory infection, unspecified J06.9 and Body
aches R52

 

                          [facility]  [address] [id number]

[address] [place] [address] 

[address]                              Sore throat J02.9

 

                          [facility]     [address] [id number]

[address] [place] [address]

                          08 Mar, 2016               

 

                          [facility]  [address] [id number]

[address] [place] [address] 

[address]                07 Mar, 2016              Contact dermatitis L25.9

 

                          [facility]     [address] [id number]

[address] [place] [address]

                                        Contact dermatitis, unspecif

ied contact dermatitis type, 

unspecified trigger L25.9

 

                          [facility]  [address] [id number]

[address] [place] [address] 

[phone]                27 2016              Wrist injury, right, initial

 encounter S69.91XA and 

Right wrist pain M25.531

 

                          [facility]     [address] [id number]

[address] [place] [address]

                          14 Sep, 2015              Fatigue 780.79 and Encounter

 for smoking cessation counseling 

V65.42

 

                          [facility]     [address] [id number]

[address] [place] [address]

                          27 Aug, 2015              Contact dermatitis 692.9

 

                          [facility]     [address] [address]

                          13 Aug, 2015              Other acariasis 133.8

 

                          [facility]     [address] [id number]

[address] [place] [phone]

                          14 2015               

 

                          [facility]     [address] [id number]

[address] [place] [address]

                                         

 

                          [facility]     [address] [id number]

[address] [place] [address]

                          18 2014               

 

                          [facility]     [address] [id number]

[address] [place] [address]

                          18 2014               

 

                          [facility]     [address] [id number]

[address] [place] [address]

                          13 2014               

 

                          [facility]     [address] [id number]

[address] [place] [phone]

                          15 Mar, 2014               

 

                          [facility]     [address] [id number]

[address] [place] [address], [place] [address]

                          15 Mar, 2014               

 

                          [facility]     [address] [id number]

[address] [place] [address], [place] [address]

                          14 2014               

 

                          [facility]     [address] [id number]

[address] [place] [address], [place] [address]

                                         

 

                          [facility]     [address] [id number]

[address] [place] [address], [place] [address]

                          24 Dec, 2013               

 

                          [facility]     [address] [id number]

[address] [place] [address], [place] [address]

                          24 Dec, 2013               

 

                          [facility]     [address] [id number]

[address] [place] [address], [place] [address]

                                         

 

                          [facility]     [address] [id number]

[address] [place] [address], [place] [address]

                                         

 

                          [facility]     [address] [id number]

[address] [place] [address], [place] [address]

                          07 Oct, 2013               

 

                          [facility]     [address] [id number]

[address] [place] [address], [place] [address]

                          04 Oct, 2013               

 

                          [facility]     [address] [id number]

[address] [place] [address], [place] [address]

                          23 Sep, 2013               

 

                          [facility]     [address] [id number]

[address] [place] [address], [place] [address]

                          18 Sep, 2013               

 

                          [facility]     [address] [id number]

[address] [place] [phone]

                                         

 

                          [facility]     [address] [id number]

[address] [place] [address], [place] [address]

                          11 Oct, 2012               

 

                          [facility]     [address] [id number]

[address] [place] [address], [place] [address]

                          11 Oct, 2012               

 

                          [facility]     [address] [id number]

[address] [place] [address], [place] [address]

                          27 Sep, 2012               

 

                          [facility]     [address] [id number]

[address] [place] [phone]

                          25 Sep, 2012               

 

                          [facility]     [address] [id number]

[address] [place] [address]

                          29 Aug, 2012               

 

                          [facility]     [address] [id number]

[address] [place] [address]

                          11 May, 2012               

 

                          [facility]     [address] [id number]

[address] [place] [address]

                                         

 

                          [facility]     [address] [id number]

[address] [place] [address]

                                         

 

                          [facility]     [address] [id number]

[address] [place] [phone]

                                         

 

                          [facility]     [address] [id number]

[address] [place] [address]

                          13 Mar, 2012               

 

                          [facility]     [address] [id number]

[address] [place] [address]

                          13 Mar, 2012               

 

                          [facility]     [address] [id number]

[address] [place] [address]

                          31 Dec, 2011               

 

                          [facility]     [address] [id number]

[address] [place] [address]

                                         

 

                          [facility]     [address] [id number]

[address] [place] [address]

                          15 Apr, 2010               

 

                          [facility]     [address] [id number]

[address] [place] [address]

                          11 Mar, 2010               

 

                          [facility]     [address] [id number]

[address] [place] [address]

                                         

 

                          [facility]     [address] [id number]

[address] [place] [phone]

                          23 Dec, 2009               

 

                          [facility]     [address] [id number]

[address] [place] [address]

                          03 Dec, 2009               







IMMUNIZATIONS

No Known Immunizations



SOCIAL HISTORY

Never Assessed



REASON FOR VISIT





PLAN OF CARE





VITAL SIGNS





MEDICATIONS

Unknown Medications



RESULTS

No Results



PROCEDURES

No Known procedures



INSTRUCTIONS





MEDICATIONS ADMINISTERED

No Known Medications



MEDICAL (GENERAL) HISTORY





                    Type                Description         Date

 

                    Medical History     acid reflux          

 

                    Medical History     asthma               

 

                    Surgical History     section x2

## 2020-07-20 NOTE — NUR
IV INCREASED TO W/O  CC FLUID BOLUS OF LR R/T DECREASED URINE OUTPUT. EATING LUNCH. 
TAKING FLUIDS VERY WELL. SPOUSE AT BEDSIDE.

## 2020-07-20 NOTE — XMS REPORT
[facility]

                             Created on: 2020



Roseline Dickens

External Reference #: [id number]

: 1990

Sex: Female



Demographics





                          Address                   [address]  [address]

 

                          Preferred Language        Unknown

 

                          Marital Status            Unknown

 

                          Jainism Affiliation     Unknown

 

                          Race                      Unknown

 

                          Ethnic Group              Unknown





Author





                          Author                    Roseline Castañeda Doctor

 

                          Organization              [facility]

 

                          Address                   Unknown

 

                          Phone                     Unavailable







Care Team Providers





                    Care Team Member Name Role                Phone

 

                    Migration,  Doctor  Unavailable         Unavailable







PROBLEMS





          Type      Condition ICD9-CM Code IIT01-UY Code Onset Dates Condition S

tatus SNOMED 

Code

 

          Problem   Abdominal pain, right upper quadrant 789.01                 

       Active    982934426

 

           Problem    Encounter for long-term (current) use of other medications

 V58.69                           

Active                                  [id number]

 

          Problem   Cough     786.2                         Active    18456131

 

          Problem   Chest pain, unspecified 786.50                        Active

    89986404

 

          Problem   Unspecified backache 724.5                         Active   

 734624227

 

          Problem   Esophageal reflux 530.81                        Active    24

0247374

 

          Problem   Acute bronchitis 466.0                         Active    105

87050

 

          Problem   Encounter for smoking cessation counseling V65.42           

             Active    09010832

 

          Problem   Sebaceous cyst 706.2                         Active    41556

3000

 

          Problem   Fatigue   780.79                        Active    52034084

 

          Problem   Unspecified disorder of skin and subcutaneous tissue 709.9  

                       Active    

17822661

 

          Problem   Allergic rhinitis, cause unspecified 477.9                  

       Active    73331785

 

          Problem   Anxiety state, unspecified 300.00                        Act

kishor    692851660

 

          Problem   Other acariasis 133.8                         Active    1872

23229

 

          Problem   Contact dermatitis 692.9                         Active    4

[id number]







ALLERGIES

No Information



ENCOUNTERS





                Encounter       Location        Date            Diagnosis

 

                    [facility] [address] [facility] [place] [address] 10 

Oct, 2018                               Encounter for immunization Z23

 

                    [facility] [address] [id number] [place] [address] 05 

Oct, 2017                               Encounter for immunization Z23

 

                          [facility]  [address] [facility] [id number]

[address] [place] [address] 

[address]                              Other viral agents as the ca

use of diseases classified 

elsewhere B97.89 ; Acute upper respiratory infection, unspecified J06.9 and Body
aches R52

 

                          [facility]  [address] [facility] [id number]

[address] [place] [address] 

[address]                05 2016              Sore throat J02.9

 

                    [facility] [id number] [place] [address] 08 

Mar, 2016                                

 

                          [facility]  [address] [facility] [id number]

[address], [place] 

[address]                07 Mar, 2016              Contact dermatitis L25.9

 

                    [facility] [id number] [place] [address] 26 

2016                               Contact dermatitis, unspecified contact 

dermatitis type, unspecified 

trigger L25.9

 

                          [facility]  [address] [facility] [id number]

[address], [place] 

[address]                              Wrist injury, right, initial

 encounter S69.91XA and 

Right wrist pain M25.531

 

                    [facility] [id number] [place] [address] 14 

Sep, 2015                               Fatigue 780.79 and Encounter for smoking

 cessation counseling V65.42

 

                    [place] [address] [address] 27 

Aug, 2015                               Contact dermatitis 692.9

 

                    [place] [address] [address] 13 

Aug, 2015                               Other acariasis 133.8

 

                    [place] [address] [address] 14 

2015                                

 

                    [facility] [address] [address]                                 

 

                    [place] [address] [address]                                 

 

                    [place] [address] [address] 18 

2014                                

 

                    [place] [address] [address]                                 

 

                    [place] [address] [address] 15 

Mar, 2014                                

 

                    [place] [address] [address] 15 

Mar, 2014                                

 

                    [facility] [address] [id number] [place] [address]                                 

 

                    [facility] [address] [id number] [place] [address]                                 

 

                    [facility] [address] [id number] [place] [address] 24 

Dec, 2013                                

 

                    [facility] [address] [id number] [place] [address] 24 

Dec, 2013                                

 

                    [facility] [address] [id number] [place] [address]                                 

 

                    [facility] [address] [id number] [place] [address]                                 

 

                    [facility] [address] [id number] [place] [address] 07 

Oct, 2013                                

 

                    [facility] [address] [id number] [place] [address] 04 

Oct, 2013                                

 

                    [facility] [address] [id number] [place] [address] 23 

Sep, 2013                                

 

                    [facility] [address] [id number] [place] [address] 18 

Sep, 2013                                

 

                    [facility] [address] [id number] [place] [address]                                 

 

                    [facility] [address] [id number] [place] [address] 11 

Oct, 2012                                

 

                    [facility] [address] [id number] [place] [address] 11 

Oct, 2012                                

 

                    [facility] [address] [id number] [place] [address] 27 

Sep, 2012                                

 

                    [facility] [address] [id number] [place] [address] 25 

Sep, 2012                                

 

                    [facility] [address] [id number] [place] [address] 29 

Aug, 2012                                

 

                    [facility] [address] [id number] [place] [address] 11 

May, 2012                                

 

                    [facility] [address] [id number] [place] [address]                                 

 

                    [facility] [address] [id number] [place] [address]                                 

 

                    [facility] [address] [id number] [place] [address]                                 

 

                    [facility] [id number] [place] [address] 13 

Mar, 2012                                

 

                    [facility] [id number] [place] [address] 13 

Mar, 2012                                

 

                    [facility] [id number] [place] [address] 31 

Dec, 2011                                

 

                    [facility] [id number] [place] [address]                                 

 

                    [facility] [id number] [place] [address] 15 

2010                                

 

                    [facility] [address] [id number] [place] [address] 11 

Mar, 2010                                

 

                    [facility] [id number] [place] [address]                                 

 

                    [facility] [address] [id number] [place] [address] 23 

Dec, 2009                                

 

                    [facility] [id number] [place] [address] 03 

Dec, 2009                                







IMMUNIZATIONS

No Known Immunizations



SOCIAL HISTORY

Never Assessed



REASON FOR VISIT





PLAN OF CARE





VITAL SIGNS





MEDICATIONS

Unknown Medications



RESULTS

No Results



PROCEDURES

No Known procedures



INSTRUCTIONS





MEDICATIONS ADMINISTERED

No Known Medications



MEDICAL (GENERAL) HISTORY





                    Type                Description         Date

 

                    Medical History     acid reflux          

 

                    Medical History     asthma               

 

                    Surgical History     section x2

## 2020-07-20 NOTE — XMS REPORT
Continuity of Care Document

                             Created on: 2020



ERIC BEAL

External Reference #: [id number]

: 1990

Sex: Female



Demographics





                          Address                   [address]  [address]

 

                          Home Phone                [phone] x

 

                          Preferred Language        Unknown

 

                          Marital Status            Unknown

 

                          Quaker Affiliation     Unknown

 

                          Race                      Unknown

 

                          Ethnic Group              Unknown





Author





                          Organization              Unknown

 

                          Address                   Unknown

 

                          Phone                     Unavailable



              



Allergies

      



             Active           Description           Code           Type         

  Severity   

                Reaction           Onset           Reported/Identified          

 

Relationship to Patient                 Clinical Status        

 

                Yes             No Known Drug Allergies           [id number]    

       Drug 

Allergy           Unknown           N/A                             2020  

      

                                                             



                  



Medications

      



There is no data.                  



Problems

      



             Date Dx Coded           Attending           Type           Code    

       

Diagnosis                               Diagnosed By        

 

             2009                                     620.0           FOLL

ICLE CYST OF 

OVARY                                            

 

             2009                                     625.3           alice

re menstrual 

pain (dysmenorrhea)                              

 

             2009                                     V72.31           Pel

live Exam 

(Internal)                                       

 

             2009           LEROY AMADOR DO K                        620.0 

          

FOLLICLE CYST OF OVARY                           

 

             2009           RUSSELL AMADOR DOA K                        625.3 

          

severe menstrual pain (dysmenorrhea)                    

 

             2009           RUSSELL AMADOR DOA K                        V72.31

           

Pelvic Exam (Internal)                           

 

             2009           LEROY AMADOR DO K                        620.0 

          

FOLLICLE CYST OF OVARY                           

 

             2009           AMADOR RUSSELL LUCIANOA K                        625.3 

          

severe menstrual pain (dysmenorrhea)                    

 

             2009           RUSSELL AMADOR DOA K                        V72.31

           

Pelvic Exam (Internal)                           

 

             2009           RUSSELL AMADOR DOA K                        620.0 

          

FOLLICLE CYST OF OVARY                           

 

             2009           AMADOR RUSSELL LUCIANOA K                        625.3 

          

severe menstrual pain (dysmenorrhea)                    

 

             2009           RUSSELL AMADOR DOA K                        V72.31

           

Pelvic Exam (Internal)                           

 

           2009                                   784.0           headache

           

        

 

             2009           RUSSELL AMADOR DOA K                        784.0 

          

headache                                         

 

             2009           AMADOR RUSSELL LUCIANOA K                        784.0 

          

headache                                         

 

             2009           AMADOR RUSSELL LUCIANOA K                        784.0 

          

headache                                         

 

             2009                                     462           PHARYN

GITIS ACUTE    

                                                 

 

             2009           RUSSELL AMADOR DOA K                        462   

        

PHARYNGITIS ACUTE                                

 

             2009           RUSSELL AMADOR DOA K                        462   

        

PHARYNGITIS ACUTE                                

 

             2009           RUSSELL AMADOR DOA K                        462   

        

PHARYNGITIS ACUTE                                

 

             2009                                     461.8           OTHE

R ACUTE 

SINUSITIS                                        

 

             2009                                     787.03           VOM

ITING ALONE    

                                                 

 

             2009                                     V72.42           PRE

GNANCY TEST 

POSITIVE RESULT                                  

 

             2009           LEROY AMADOR DO                        461.8 

          

OTHER ACUTE SINUSITIS                            

 

             2009           LEROY MAADOR DO                        787.03

           

VOMITING ALONE                                   

 

             2009           LEROY AMADOR DO                        V72.42

           

PREGNANCY TEST POSITIVE RESULT                    

 

             2009           LEROY AMADOR DO                        461.8 

          

OTHER ACUTE SINUSITIS                            

 

             2009           LEROY AMADOR DO                        787.03

           

VOMITING ALONE                                   

 

             2009           LEROY AMADOR DO                        V72.42

           

PREGNANCY TEST POSITIVE RESULT                    

 

             2009           LEROY AMADOR DO                        461.8 

          

OTHER ACUTE SINUSITIS                            

 

             2009           LEROY AMADOR DO                        787.03

           

VOMITING ALONE                                   

 

             2009           LEROY AMADOR DO                        V72.42

           

PREGNANCY TEST POSITIVE RESULT                    

 

             2009                                     616.10           VAG

INITIS         

                                                 

 

             2009                                     V22.0           Supe

rvision Of 

Normal First Pregnancy                           

 

             2009                                     V74.5           visi

t for: 

screening exam bact/spirochetal venereal disease                    

 

             2009           LEROY AMADOR DO                        616.10

           

VAGINITIS                                        

 

             2009           LEROY AMADOR DO                        V22.0 

          

Supervision Of Normal First Pregnancy                    

 

             2009           LEROY AMADOR DO                        V74.5 

          

visit for: screening exam bact/spirochetal venereal disease                    

 

             2009           LEROY AMADOR DO                        616.10

           

VAGINITIS                                        

 

             2009           LEROY AMADOR DO                        V22.0 

          

Supervision Of Normal First Pregnancy                    

 

             2009           LEROY AMADOR DO                        V74.5 

          

visit for: screening exam bact/spirochetal venereal disease                    

 

             2009           LEROY AMADOR DO                        616.10

           

VAGINITIS                                        

 

             2009           LEROY AMADOR DO                        V22.0 

          

Supervision Of Normal First Pregnancy                    

 

             2009           LEROY AMADOR DO                        V74.5 

          

visit for: screening exam bact/spirochetal venereal disease                    

 

             2010                                     656.13           RH 

NEGATIVE   

RHESUS ISOIMMUNIZATION                           

 

             2010           LEROY AMADOR DO                        656.13

           RH 

NEGATIVE   RHESUS ISOIMMUNIZATION                    

 

             2010           LEROY AMADOR DO                        656.13

           RH 

NEGATIVE   RHESUS ISOIMMUNIZATION                    

 

             2010           LEROY AMADOR DO                        656.13

           RH 

NEGATIVE   RHESUS ISOIMMUNIZATION                    

 

             2010                                     787.01           fili

sea with 

vomiting                                         

 

             2010                                     787.91           NOLAN

RRHEA          

                                                 

 

             2010           LEROY AMADOR DO                        787.01

           

nausea with vomiting                             

 

             2010           LEROY AMADOR DO                        787.91

           

DIARRHEA                                         

 

             2010           LEROY AMADOR DO                        787.01

           

nausea with vomiting                             

 

             2010           LEROY AMADOR DO                        787.91

           

DIARRHEA                                         

 

             2010           LEROY AMADOR DO                        787.01

           

nausea with vomiting                             

 

             2010           LEROY AMADOR DO                        787.91

           

DIARRHEA                                         

 

             2010                                     008.8           LASHANDA

ROENTERITIS 

VIRAL                                            

 

             2010           LEROY AMADOR DO                        008.8 

          

GASTROENTERITIS VIRAL                            

 

             2010           LEROY AMADOR DO                        008.8 

          

GASTROENTERITIS VIRAL                            

 

             2010           LEROY AMADOR DO                        008.8 

          

GASTROENTERITIS VIRAL                            

 

             2010                                     465.9           UPPE

R RESPIRATORY 

INFECTION                                        

 

             2010           LEROY AMADOR DO                        465.9 

          

UPPER RESPIRATORY INFECTION                      

 

             2010           LEROY AMADOR DO                        465.9 

          

UPPER RESPIRATORY INFECTION                      

 

             2010           LEROY AMADOR DO                        465.9 

          

UPPER RESPIRATORY INFECTION                      

 

             2011                                     009.1           COLI

TIS ENTERITIS 

AND GASTROENTERITIS OF PRESUMED INFECTIOUS ORIGIN                    

 

             2011           LEROY AMADOR DO                        009.1 

          

COLITIS ENTERITIS AND GASTROENTERITIS OF PRESUMED INFECTIOUS ORIGIN             

      

 

             2011           LEROY AMADOR DO                        009.1 

          

COLITIS ENTERITIS AND GASTROENTERITIS OF PRESUMED INFECTIOUS ORIGIN             

      

 

             2011           LEROY AMADOR DO                        009.1 

          

COLITIS ENTERITIS AND GASTROENTERITIS OF PRESUMED INFECTIOUS ORIGIN             

      

 

           2012                                   300.00           anxiety

           

        

 

             2012                                     466.0           Acut

e Bronchitis   

                                                 

 

             2012                                     V58.69           keaton

ing high-risk 

medication                                       

 

             2012           LEROY AMADOR DO                        300.00

           

anxiety                                          

 

             2012           LEROY AMADOR DO                        466.0 

          

Acute Bronchitis                                 

 

             2012           LEROY AMADOR DO                        V58.69

           

taking high-risk medication                      

 

             2012           LEROY AMADOR DO                        300.00

           

anxiety                                          

 

             2012           LEROY AMADOR DO                        466.0 

          

Acute Bronchitis                                 

 

             2012           LEROY AMADOR DO                        V58.69

           

taking high-risk medication                      

 

             2012           AMADOR DO, LEROY K                        300.00

           

anxiety                                          

 

             2012           AMADOR DO, LEROY K                        466.0 

          

Acute Bronchitis                                 

 

             2012           AMADOR DO, LEROY K                        V58.69

           

taking high-risk medication                      

 

             2012                                     706.2           Seba

ceous Cyst     

                                                 

 

             2012           AMADOR DO, LEROY K                        706.2 

          

Sebaceous Cyst                                   

 

             2012           AMADOR DO, LEROY K                        706.2 

          

Sebaceous Cyst                                   

 

             2012           AMADOR DO, LEROY K                        706.2 

          

Sebaceous Cyst                                   

 

           2012                                   477.9           RHINITIS

           

        

 

             2012           AMADOR DO, LEROY K                        477.9 

          

RHINITIS                                         

 

             2012           AMADOR DO, LEROY K                        477.9 

          

RHINITIS                                         

 

             2012           AMADOR DO, LEROY K                        477.9 

          

RHINITIS                                         

 

             2012                                     466.0           BRON

CHITIS, ACUTE  

                                                 

 

           2012                                   786.2           cough   

           

     

 

             2012                                     786.50           GEO

ST PAIN        

                                                 

 

             2012                                     V65.42           COU

NSELING - 

SMOKING CESSATION                                

 

             2012           AMADOR DO, LEROY K                        466.0 

          

BRONCHITIS, ACUTE                                

 

             2012           AMADOR DO, ELROY K                        786.2 

          

cough                                            

 

             2012           AMADOR DO, LEROY K                        786.50

           

CHEST PAIN                                       

 

             2012           AMADOR DO, LEROY K                        V65.42

           

COUNSELING - SMOKING CESSATION                    

 

             2012           AMADOR DO, LEROY K                        466.0 

          

BRONCHITIS, ACUTE                                

 

             2012           AMADOR DO, LEROY K                        786.2 

          

cough                                            

 

             2012           AMADOR DO, LEROY K                        786.50

           

CHEST PAIN                                       

 

             2012           AMADOR DO, LEROY K                        V65.42

           

COUNSELING - SMOKING CESSATION                    

 

             2012           AMADOR DO, LEROY K                        466.0 

          

BRONCHITIS, ACUTE                                

 

             2012           AMADOR DO, LEROY K                        786.2 

          

cough                                            

 

             2012           AMADOR DO, LEROY K                        786.50

           

CHEST PAIN                                       

 

             2012           AMADOR DO, LEROY K                        V65.42

           

COUNSELING - SMOKING CESSATION                    

 

           10/11/2012                                   724.5           BACKACHE

           

        

 

             10/11/2012           AMADOR DO, LEROY K                        724.5 

          

BACKACHE                                         

 

             10/11/2012           AMADOR DO, LEROY K                        724.5 

          

BACKACHE                                         

 

             10/11/2012           AMADOR DO, LEROY K                        724.5 

          

BACKACHE                                         

 

             2013           AMADOR DO, LEROY K                        530.81

           

GERD                                             

 

             2013           LEROY AMADOR DO K                        530.81

           

GERD                                             

 

             2013           LEROY AMADOR DO K                        530.81

           

GERD                                             

 

             2013           LEROY AMADOR DO K                        789.01

           

ABDOMINAL PAIN RIGHT UPPER QUADRANT                    

 

             2013           LEROY AMADOR DO K                        789.01

           

ABDOMINAL PAIN RIGHT UPPER QUADRANT                    

 

             2013           LEROY AMADOR DO K                        789.01

           

ABDOMINAL PAIN RIGHT UPPER QUADRANT                    

 

             2013           LEROY AMADOR DO K                        709.9 

          

UNSPECIFIED DISORDER OF SKIN AND SUBCUTANEOUS TISSUE                    

 

             2013           LEROY AMADOR DO K                        709.9 

          

UNSPECIFIED DISORDER OF SKIN AND SUBCUTANEOUS TISSUE                    

 

             2014           RAHUL EAGLE APRN           Ot           850

.0           

CONCUSSION W/O COMA                              

 

                2014           RAHUL EAGLE APRN           Ot           

   873.42          

                          OPEN WOUND OF FOREHEAD                    

 

                2014           RAHUL EAGLE APRN           Ot           

   E000.8          

                          OTHER EXTERNAL CAUSE STATUS                    

 

                2014           RAHUL EAGLE APRN           Ot           

   E007.3          

                          ACTIVITIES INVOLVING BASEBALL                    

 

                2014           RAHUL EAGLE APRN           Ot           

   E849.4          

                          ACCID IN RECREATION AREA                    

 

                2014           RAHUL EAGLE APRN           Ot           

   E917.0          

                          STRUCK IN SPORTS                    

 

                2015           RAUL BOND DO           Ot          

    623.5          

                                                             

 

                2015           RAUL BOND DO           Ot          

    644.03         

                                                             

 

                2015           FENECH RAUL LUCIANO           Ot          

    654.73         

                                                             

 

             02/10/2015           ZACKERY AL, LIZZY REBOLLEDO           Ot           V22

.1           

                                                 

 

                02/10/2015           FENECH DORAUL           Ot          

    276.51         

                                                             

 

                02/10/2015           FENECH DORAUL           Ot          

    644.03         

                                                             

 

                02/10/2015           FENECH DORAUL           Ot          

    646.83         

                                                             

 

                2015           FENECH DORAUL           Ot          

    644.21         

                                                             

 

                2015           FENECH DORAUL           Ot          

    654.21         

                                                             

 

                2015           FENECH RAUL LUCIANO           Ot          

    663.31         

                                                             

 

                2015           FENECH RAUL LUCIANO           Ot          

    V27.0          

                                                             

 

             2015           ZACKERY AL, LIZZY REBOLLEDO           Ot           V22

.1           

                                                 

 

             2015           LIZZY VIZCARRA MD           Ot           V22

.1           

                                                 

 

             2018                        Ot           645.23           PRL

ONGED PREG, 

ANTEPARTUM CONDITION OR C                        

 

             2018                        Ot           V72.63           PRE

-PROCEDURAL 

LABORATORY EXAMINATION                           

 

             2018                        Ot           V74.8           SCRE

EN-BACTERIAL 

DIS NEC                                          

 

             2018                        Ot           644.13           THR

EAT LABOR NEC-

ANTEPAR                                          

 

                2018           RAUL BOND DO           Ot          

    N83.202        

                          UNSPECIFIED OVARIAN CYST, LEFT SIDE                   

 

 

             2018                        Ot           645.23           PRL

ONGED PREG, 

ANTEPARTUM CONDITION OR C                        

 

             2018                        Ot           V72.63           PRE

-PROCEDURAL 

LABORATORY EXAMINATION                           

 

             2018                        Ot           V74.8           SCRE

EN-BACTERIAL 

DIS NEC                                          

 

             2018                        Ot           645.23           PRL

ONGED PREG, 

ANTEPARTUM CONDITION OR C                        

 

             2018                        Ot           V72.63           PRE

-PROCEDURAL 

LABORATORY EXAMINATION                           

 

             2018                        Ot           V74.8           SCRE

EN-BACTERIAL 

DIS NEC                                          

 

             2018                        Ot           644.13           THR

EAT LABOR NEC-

ANTEPAR                                          

 

             2018                        Ot           644.13           THR

EAT LABOR NEC-

ANTEPAR                                          

 

             2019                        Ot           645.23           PRL

ONGED PREG, 

ANTEPARTUM CONDITION OR C                        

 

             2019                        Ot           V72.63           PRE

-PROCEDURAL 

LABORATORY EXAMINATION                           

 

             2019                        Ot           V74.8           SCRE

EN-BACTERIAL 

DIS NEC                                          

 

                01/15/2019           RAUL BOND DO           Ot          

    N83.202        

                          UNSPECIFIED OVARIAN CYST, LEFT SIDE                   

 

 

             2019           ISHMAEL AL, BRIGIDO BROWN           Ot           N83.20

2           

UNSPECIFIED OVARIAN CYST, LEFT SIDE                    

 

                2019           RAUL BOND DO           Ot          

    Z01.818        

                          ENCOUNTER FOR OTHER PREPROCEDURAL EXAMIN              

      

 

                2019           RAUL BOND DO           Ot          

    Z01.818        

                          ENCOUNTER FOR OTHER PREPROCEDURAL EXAMIN              

      

 

                2019           RAUL BOND DO           Ot          

    N83.202        

                          UNSPECIFIED OVARIAN CYST, LEFT SIDE                   

 

 

             2019           ISHMAEL AL, BRIGIDO BROWN           Ot           N83.20

2           

UNSPECIFIED OVARIAN CYST, LEFT SIDE                    

 

                2019           RAUL BOND DO           Ot          

    E66.9          

                          OBESITY, UNSPECIFIED                    

 

                2019           RAUL BOND DO           Ot          

    F17.210        

                          NICOTINE DEPENDENCE, CIGARETTES, UNCOMPL              

      

 

                2019           RAUL BOND DO           Ot          

    J45.909        

                          UNSPECIFIED ASTHMA, UNCOMPLICATED                    

 

                2019           RAUL BOND DO           Ot          

    N83.292        

                          OTHER OVARIAN CYST, LEFT SIDE                    

 

                2019           FENECH DO, RAUL S           Ot          

    R10.2          

                          PELVIC AND PERINEAL PAIN                    

 

                2019           NIDIAECH DO, RAUL CERRATO           Ot          

    Z68.34         

                          BODY MASS INDEX (BMI) 34.0-34.9, ADULT                

    

 

             2019                        Ot           644.13           THR

EAT LABOR NEC-

ANTEPAR                                          

 

             2020                        Ot           644.13           THR

EAT LABOR NEC-

ANTEPAR                                          

 

                04/15/2020           FENECH DO, RAUL S           Ot          

    N83.202        

                          UNSPECIFIED OVARIAN CYST, LEFT SIDE                   

 

 

             04/15/2020           ISHMAEL AL, BRIGIDO BROWN           Ot           N83.20

2           

UNSPECIFIED OVARIAN CYST, LEFT SIDE                    

 

                04/15/2020           FENECH DO, RAUL CERRATO           Ot          

    N83.202        

                          UNSPECIFIED OVARIAN CYST, LEFT SIDE                   

 

 

             04/15/2020           ISHMAEL AL, BRIGIDO BROWN           Ot           N83.20

2           

UNSPECIFIED OVARIAN CYST, LEFT SIDE                    

 

                2020           FENECH DO, RAUL CERRATO           Ot          

    N83.202        

                          UNSPECIFIED OVARIAN CYST, LEFT SIDE                   

 

 

             2020           ISHMAEL AL, BRIGIDO BROWN           Ot           N83.20

2           

UNSPECIFIED OVARIAN CYST, LEFT SIDE                    

 

                2020           COURTNEY AL, JN HARRELL           Ot            

  N83.202          

                          UNSPECIFIED OVARIAN CYST, LEFT SIDE                   

 

 

             2020                        Ot           644.13           THR

EAT LABOR NEC-

ANTEPAR                                          



                                                                                
                                                                                
                                                                                
                                                                                
                                      



Procedures

      



                Code            Description           Performed By           Per

formed On        

 

                                17845                                 HIDA SCAN 

                    

                                        2013        

 

                                      40034                                 STRE

P A  (IN-HOUSE)           

                                                    03/15/2014        



                    



Results

      



                    Test                Result              Range        

 

                                        Urine beta human chorionic gonadotropin 

(hCG) measurement - 19 08:20      

  

 

                          Urine beta human chorionic gonadotropin (hCG) measurem

ent           NEGATIVE    

                                        NEGATIVE        

 

                                        Methicillin resistant Staphylococcus aur

eus (MRSA) screening culture - 19 

08:45         

 

                          Methicillin resistant Staphylococcus aureus (MRSA) scr

eening culture           

NEG                                     NRG        

 

                                        Complete blood count (CBC) with automate

d white blood cell (WBC) differential - 

19 09:15         

 

                          Blood leukocytes automated count (number/volume)      

     8.6 10*3/uL          

                                        4.3-11.0        

 

                          Blood erythrocytes automated count (number/volume)    

       4.07 10*6/uL       

                                        4.35-5.85        

 

                    Venous blood hemoglobin measurement (mass/volume)           

12.9 g/dL           

11.5-16.0        

 

                    Blood hematocrit (volume fraction)           39 %           

     35-52        

 

                    Automated erythrocyte mean corpuscular volume           95 [

foz_us]           

80-99        

 

                                        Automated erythrocyte mean corpuscular h

emoglobin (mass per erythrocyte)        

                          32 pg                     25-34        

 

                                        Automated erythrocyte mean corpuscular h

emoglobin concentration measurement 

(mass/volume)             33 g/dL                   32-36        

 

                    Automated erythrocyte distribution width ratio           12.

9 %              10.0-

14.5        

 

                    Automated blood platelet count (count/volume)           211 

10*3/uL           

130-400        

 

                          Automated blood platelet mean volume measurement      

     12.0 [foz_us]        

                                        7.4-10.4        

 

                    Automated blood neutrophils/100 leukocytes           56 %   

             42-75       

 

 

                    Automated blood lymphocytes/100 leukocytes           35 %   

             12-44       

 

 

                    Blood monocytes/100 leukocytes           7 %                

 0-12        

 

                    Automated blood eosinophils/100 leukocytes           2 %    

             0-10        

 

                    Automated blood basophils/100 leukocytes           0 %      

           0-10        

 

                    Blood neutrophils automated count (number/volume)           

4.8 10*3            

1.8-7.8        

 

                    Blood lymphocytes automated count (number/volume)           

3.0 10*3            

1.0-4.0        

 

                    Blood monocytes automated count (number/volume)           0.

6 10*3            

0.0-1.0        

 

                    Automated eosinophil count           0.2 10*3/uL           0

.0-0.3        

 

                    Automated blood basophil count (count/volume)           0.0 

10*3/uL           

0.0-0.1        

 

                                        Blood type T Indirect antibody screen [facility] - 19 09:15         

 

                    ABO+Rh group           ON                  NRG        

 

                    Transfusion band number           [id number]             NRG   

     

 

                    Blood group antibody screen           NEGATIVE            NR

G        



                      



Encounters

      



                ACCT No.           Visit Date/Time           Discharge          

 Status         

             Pt. Type           Provider           Facility           Loc./Unit 

          

Complaint        

 

                [id number]           03/15/2014 10:34:00           03/15/2014 23:59:

59           CLS

                Outpatient           LEROY AMADOR DO                           

         

                                                 

 

                [id number]           2013 07:46:00           2013 23:59:

59           CLS

                Outpatient           LEROY AMADOR DO                           

         

                                                 

 

                [id number]           2013 16:11:00           2013 23:59:

59           CLS

                Outpatient           LEROY AMADOR DO                           

         

                                                 

 

                [id number]           2013 09:15:00           2013 23:59:

59           CLS

             Outpatient                                                         

  

 

                    [id number]           2020 07:13:00            10:28:00        

                DIS             Outpatient           RAUL BOND DO       

    [facility]           PREOP                     CHRONIC PELVIC 

PAIN,ENDOMETRIOSIS        

 

                    [id number]           2020 12:26:00            23:59:59        

                CLS             Outpatient           COURTNEY AL, JN HARRELL         

  [facility]           RAD                       INTRACTABLE ABN PAIN LL

Q,RUPTURED 

OVARIAN CYST        

 

                    [id number]           2019 08:12:00            13:25:00        

                DIS             Outpatient           RAUL BOND DO       

    [facility]           SDC                       CPP/ LEFT OVARIAN CYST

        

 

                    [id number]           2019 05:38:00            13:00:00        

                DIS             Outpatient           RAUL BOND DO       

    [facility]           PREOP                     CPP, LEFT OVARIAN CYST

        

 

                    [id number]           2019 13:36:00            23:59:59        

                CLS             Outpatient           BRIGIDO QUIÑONES MD           

[facility]           RAD                       LLQ MASS        

 

                    [id number]           2018 09:46:00            23:59:59        

                CLS             Outpatient           RAUL BOND DO       

    [facility]           RAD                       N93.9 ABNORMAL UTERINE

 BLEEDING   

     

 

                    [id number]           2015 17:54:00            14:55:00        

                DIS             Inpatient           RAUL BOND DO        

   [facility]           LDRP                               

 

                    [id number]           02/10/2015 20:40:00           02/10/2

015 21:00:00        

                DIS             Outpatient           RAUL BOND DO       

    [facility]                                

 

                    [id number]           2015 19:12:00            21:18:00        

                DIS             Outpatient           RAUL BOND DO       

    [facility]           WSo                                

 

                    [id number]           2014 09:57:00           

014 23:59:59        

                CLS             Outpatient           ZACKERY AL, LIZZY REBOLLEDO        

   [facility]           RAD                                

 

                    [id number]           2014 19:29:00           

014 21:15:00        

                DIS             Emergency           RAHUL EAGLE         

  [facility]           ER                        HEAD INJ,PLAYING SOFTBA

LL        

 

                    [id number]           10/30/2013 08:56:00           10/30/2

013 12:30:00        

           DIS           Outpatient                                             

        

   

 

                    [id number]           10/24/2013 07:17:00           10/24/2

013 23:59:59        

           CLS           Outpatient                                             

        

   

 

                    [id number]           2013 11:21:00            23:59:59        

           CLS           Outpatient                                             

        

   

 

                    [id number]           2013 09:29:00            14:28:00        

           DIS           Emergency                                              

        

  

 

                    [id number]           2013 09:34:00            23:59:59        

           CLS           Outpatient                                             

        

   

 

             [id number]           2020 09:30:00                        P

EN           

Preadmit                  RAUL BOND DO           [facility]                 SDC                       CHRONIC PELVIC PAIN,ENDOMETR

IOSIS        

 

             [id number]           2010 14:18:00                         

            

Document Registration                                                           

         

 

             [id number]           2010 20:50:00                         

            

Document Registration                                                           

         

 

                [id number]           10/05/2017 10:50:00           10/05/2017 23:59:5

9           CLS 

                Outpatient           [place]                     

      

[facility]

## 2020-07-20 NOTE — NUR
BIRD D/C'ED WITH 550 CC IN BAG. PERICARE WITH PAD/UNDERWEAR APPLIED.UP TO SIT IN THE CHAIR. 
SPOUSE AT BEDSIDE.

## 2020-07-20 NOTE — XMS REPORT
[facility]

                             Created on: 2019



Roseline Dickens

External Reference #: [id number]

: 1990

Sex: Female



Demographics





                          Address                   [address]  [address]

 

                          Preferred Language        Unknown

 

                          Marital Status            Unknown

 

                          Hoahaoism Affiliation     Unknown

 

                          Race                      Unknown

 

                          Ethnic Group              Unknown





Author





                          Author                    Roseline Castañeda Doctor

 

                          Organization              [facility]

 

                          Address                   Unknown

 

                          Phone                     Unavailable







Care Team Providers





                    Care Team Member Name Role                Phone

 

                    Migration,  Doctor  Unavailable         Unavailable







PROBLEMS





          Type      Condition ICD9-CM Code JKS91-BI Code Onset Dates Condition S

tatus SNOMED 

Code

 

          Problem   Abdominal pain, right upper quadrant 789.01                 

       Active    052681151

 

           Problem    Encounter for long-term (current) use of other medications

 V58.69                           

Active                                  [id number]

 

          Problem   Cough     786.2                         Active    38979163

 

          Problem   Chest pain, unspecified 786.50                        Active

    35657323

 

          Problem   Unspecified backache 724.5                         Active   

 [id number]

 

          Problem   Esophageal reflux 530.81                        Active    24

1875006

 

          Problem   Acute bronchitis 466.0                         Active    105

76536

 

          Problem   Encounter for smoking cessation counseling V65.42           

             Active    01853001

 

          Problem   Sebaceous cyst 706.2                         Active    25175

3000

 

          Problem   Fatigue   780.79                        Active    09530700

 

          Problem   Unspecified disorder of skin and subcutaneous tissue 709.9  

                       Active    

72321396

 

          Problem   Allergic rhinitis, cause unspecified 477.9                  

       Active    53464285

 

          Problem   Anxiety state, unspecified 300.00                        Act

kishor    [id number]

 

          Problem   Other acariasis 133.8                         Active    1872

11579

 

          Problem   Contact dermatitis 692.9                         Active    4

[id number]







ALLERGIES

No Information



ENCOUNTERS





                Encounter       Location        Date            Diagnosis

 

                          [facility]     [address] [id number]

[address] [place] [phone]

                          10 Oct, 2018              Encounter for immunization Z

23

 

                          [facility]     [address] [id number]

[address] [place] [phone]

                          05 Oct, 2017              Encounter for immunization Z

23

 

                          [facility]  [address] [id number]

[address] [place] [address] 

[phone]                              Other viral agents as the ca

use of diseases classified 

elsewhere B97.89 ; Acute upper respiratory infection, unspecified J06.9 and Body
aches R52

 

                          [facility]  [address] [id number]

[address] [place] [address] 

[address]                              Sore throat J02.9

 

                          [facility]     [address] [id number]

[address] [place] [phone]

                          08 Mar, 2016               

 

                          [facility]  [address] [id number]

[address] [place] [address] 

[phone]                07 Mar, 2016              Contact dermatitis L25.9

 

                          [facility]     [address] [id number]

[address] [place] [phone]

                                        Contact dermatitis, unspecif

ied contact dermatitis type, 

unspecified trigger L25.9

 

                          [facility]  [address] [id number]

[address] [place] [address] 

[phone]                27 2016              Wrist injury, right, initial

 encounter S69.91XA and 

Right wrist pain M25.531

 

                          [facility]     [address] [id number]

[address] [place] [phone]

                          14 Sep, 2015              Fatigue 780.79 and Encounter

 for smoking cessation counseling 

V65.42

 

                          [facility]     [address] [id number]

[address] [place] [phone]

                          27 Aug, 2015              Contact dermatitis 692.9

 

                          [facility]     [address] [phone]

                          13 Aug, 2015              Other acariasis 133.8

 

                          [facility]     [address] [id number]

[address] [place] [phone]

                          14 2015               

 

                          [facility]     [address] [id number]

[address] [place] [phone]

                                         

 

                          [facility]     [address] [id number]

[address] [place] [phone]

                          18 2014               

 

                          [facility]     [address] [id number]

[address] [place] [phone]

                          18 2014               

 

                          [facility]     [address] [id number]

[address] [place] [phone]

                          13 2014               

 

                          [facility]     [address] [id number]

[address] [place] [phone]

                          15 Mar, 2014               

 

                          [facility]     [address] [id number]

[address] [place] [address], [place] [address]

                          15 Mar, 2014               

 

                          [facility]     [address] [id number]

[address] [place] [address], [place] [address]

                          14 2014               

 

                          [facility]     [address] [id number]

[address] [place] [address], [place] [address]

                                         

 

                          [facility]     [address] [id number]

[address] [place] [address], [place] [address]

                          24 Dec, 2013               

 

                          [facility]     [address] [id number]

[address] [place] [address], [place] [address]

                          24 Dec, 2013               

 

                          [facility]     [address] [id number]

[address] [place] [address], [place] [address]

                                         

 

                          [facility]     [address] [id number]

[address] [place] [address], [place] [address]

                                         

 

                          [facility]     [address] [id number]

[address] [place] [address], [place] [address]

                          07 Oct, 2013               

 

                          [facility]     [address] [id number]

[address] [place] [address], [place] [address]

                          04 Oct, 2013               

 

                          [facility]     [address] [id number]

[address] [place] [address], [place] [address]

                          23 Sep, 2013               

 

                          [facility]     [address] [id number]

[address] [place] [address], [place] [address]

                          18 Sep, 2013               

 

                          [facility]     [address] [id number]

[address] [place] [phone]

                                         

 

                          [facility]     [address] [id number]

[address] [place] [address], [place] [address]

                          11 Oct, 2012               

 

                          [facility]     [address] [id number]

[address] [place] [address], [place] [address]

                          11 Oct, 2012               

 

                          [facility]     [address] [id number]

[address] [place] [address], [place] [address]

                          27 Sep, 2012               

 

                          [facility]     [address] [id number]

[address] [place] [phone]

                          25 Sep, 2012               

 

                          [facility]     [address] [id number]

[address] [place] [phone]

                          29 Aug, 2012               

 

                          [facility]     [address] [id number]

[address] [place] [phone]

                          11 May, 2012               

 

                          [facility]     [address] [id number]

[address] [place] [address]

                                         

 

                          [facility]     [address] [id number]

[address] [place] [phone]

                                         

 

                          [facility]     [address] [id number]

[address] [place] [phone]

                                         

 

                          [facility]     [address] [id number]

[address] [place] [phone]

                          13 Mar, 2012               

 

                          [facility]     [address] [id number]

[address] [place] [phone]

                          13 Mar, 2012               

 

                          [facility]     [address] [id number]

[address] [place] [phone]

                          31 Dec, 2011               

 

                          [facility]     [address] [id number]

[address] [place] [phone]

                                         

 

                          [facility]     [address] [id number]

[address] [place] [phone]

                          15 Apr, 2010               

 

                          [facility]     [address] [id number]

[address] [place] [phone]

                          11 Mar, 2010               

 

                          [facility]     [address] [id number]

[address] [place] [phone]

                                         

 

                          [facility]     [address] [id number]

[address] [place] [phone]

                          23 Dec, 2009               

 

                          [facility]     [address] [id number]

[address] [place] [phone]

                          03 Dec, 2009               







IMMUNIZATIONS

No Known Immunizations



SOCIAL HISTORY

Never Assessed



REASON FOR VISIT

[facility]



PLAN OF CARE





VITAL SIGNS





MEDICATIONS

Unknown Medications



RESULTS

No Results



PROCEDURES

No Known procedures



INSTRUCTIONS





MEDICATIONS ADMINISTERED

No Known Medications



MEDICAL (GENERAL) HISTORY





                    Type                Description         Date

 

                    Medical History     acid reflux          

 

                    Medical History     asthma               

 

                    Surgical History     section x2

## 2020-07-20 NOTE — XMS REPORT
[facility]

                             Created on: 2019



Roseline Dickens

External Reference #: [id number]

: 1990

Sex: Female



Demographics





                          Address                   [address]  [address]

 

                          Preferred Language        Unknown

 

                          Marital Status            Unknown

 

                          Mandaen Affiliation     Unknown

 

                          Race                      Unknown

 

                          Ethnic Group              Unknown





Author





                          Author                    Roseline Castañeda Doctor

 

                          Organization              [facility]

 

                          Address                   Unknown

 

                          Phone                     Unavailable







Care Team Providers





                    Care Team Member Name Role                Phone

 

                    Migration,  Doctor  Unavailable         Unavailable







PROBLEMS





          Type      Condition ICD9-CM Code STF31-SJ Code Onset Dates Condition S

tatus SNOMED 

Code

 

          Problem   Abdominal pain, right upper quadrant 789.01                 

       Active    [id number]

 

           Problem    Encounter for long-term (current) use of other medications

 V58.69                           

Active                                  [id number]

 

          Problem   Cough     786.2                         Active    49244698

 

          Problem   Chest pain, unspecified 786.50                        Active

    43232119

 

          Problem   Unspecified backache 724.5                         Active   

 [id number]

 

          Problem   Esophageal reflux 530.81                        Active    24

3650990

 

          Problem   Acute bronchitis 466.0                         Active    105

43907

 

          Problem   Encounter for smoking cessation counseling V65.42           

             Active    86447486

 

          Problem   Sebaceous cyst 706.2                         Active    01810

3000

 

          Problem   Fatigue   780.79                        Active    65326146

 

          Problem   Unspecified disorder of skin and subcutaneous tissue 709.9  

                       Active    

62438591

 

          Problem   Allergic rhinitis, cause unspecified 477.9                  

       Active    42048785

 

          Problem   Anxiety state, unspecified 300.00                        Act

kishor    287081091

 

          Problem   Other acariasis 133.8                         Active    1872

57642

 

          Problem   Contact dermatitis 692.9                         Active    4

[id number]







ALLERGIES

No Information



ENCOUNTERS





                Encounter       Location        Date            Diagnosis

 

                          [facility] [place]     [address] [facility] [id number]

[address] [place] [address]

                          10 Oct, 2018              Encounter for immunization Z

23

 

                          [facility] [place]     [address] [facility] [id number]

[address] [place] [address]

                          05 Oct, 2017              Encounter for immunization Z

23

 

                          [facility]  [address] [facility] [id number]

[address] [place] [address] 

[address]                              Other viral agents as the ca

use of diseases classified 

elsewhere B97.89 ; Acute upper respiratory infection, unspecified J06.9 and Body
aches R52

 

                          [facility]  [address] [facility] [id number]

[address] [place] [address] 

[address]                              Sore throat J02.9

 

                          [facility] [place]     [address] [facility] [id number]

[address] [place] [address]

                          08 Mar, 2016               

 

                          [facility]  [address] [facility] [id number]

[address] [place] [address] 

[address]                07 Mar, 2016              Contact dermatitis L25.9

 

                          [facility] [place]     [address] [facility] [id number]

[address] [place] [phone]

                                        Contact dermatitis, unspecif

ied contact dermatitis type, 

unspecified trigger L25.9

 

                          [facility]  [address] [facility] [id number]

[address] [place] [address] 

[phone]                27 2016              Wrist injury, right, initial

 encounter S69.91XA and 

Right wrist pain M25.531

 

                          [facility] [place]     [address] [facility] [id number]

[address] [place] [address]

                          14 Sep, 2015              Fatigue 780.79 and Encounter

 for smoking cessation counseling 

V65.42

 

                          [facility] [place]     [address] [id number]

[address] [place] [address]

                          27 Aug, 2015              Contact dermatitis 692.9

 

                          [facility] [place]     [address] [address]

                          13 Aug, 2015              Other acariasis 133.8

 

                          [facility] [place]     [address] [facility] [id number]

[address] [place] [address]

                          14 2015               

 

                          [facility] [place]     [address] [id number]

[address] [place] [address]

                                         

 

                          [facility] [place]     [address] [facility] [id number]

[address] [place] [phone]

                          18 2014               

 

                          [facility] [place]     [address] [id number]

[address] [place] [phone]

                          18 2014               

 

                          [facility] [place]     [address] [facility] [id number]

[address] [place] [phone]

                          13 2014               

 

                          [facility] [place]     [address] [id number]

[address] [place] [address]

                          15 Mar, 2014               

 

                          [facility]     [address] [id number]

[address] [place] [address], [place] [address]

                          15 Mar, 2014               

 

                          [facility]     [address] [id number]

[address] [place] [address], [place] [address]

                          14 2014               

 

                          [facility]     [address] [id number]

[address] [place] [address], [place] [address]

                                         

 

                          [facility]     [address] [id number]

[address] [place] [address], [place] [address]

                          24 Dec, 2013               

 

                          [facility]     [address] [id number]

[address] [place] [address], [place] [address]

                          24 Dec, 2013               

 

                          [facility]     [address] [id number]

[address] [place] [address], [place] [address]

                                         

 

                          [facility]     [address] [id number]

[address] [place] [address], [place] [address]

                                         

 

                          [facility]     [address] [id number]

[address] [place] [address], [place] [address]

                          07 Oct, 2013               

 

                          [facility]     [address] [id number]

[address] [place] [address], [place] [address]

                          04 Oct, 2013               

 

                          [facility]     [address] [id number]

[address] [place] [address], [place] [address]

                          23 Sep, 2013               

 

                          [facility]     [address] [id number]

[address] [place] [address], [place] [address]

                          18 Sep, 2013               

 

                          [facility]     [address] [id number]

[address] [place] [address]

                                         

 

                          [facility]     [address] [id number]

[address] [place] [address], [place] [address]

                          11 Oct, 2012               

 

                          [facility]     [address] [id number]

[address] [place] [address], [place] [address]

                          11 Oct, 2012               

 

                          [facility]     [address] [id number]

[address] [place] [address], [place] [address]

                          27 Sep, 2012               

 

                          [facility]     [address] [id number]

[address] [place] [address]

                          25 Sep, 2012               

 

                          [facility] [place]     [address] [id number]

[address] [place] [phone]

                          29 Aug, 2012               

 

                          [facility] [place]     [address] [id number]

[address] [place] [address]

                          11 May, 2012               

 

                          [facility] [place]     [address] [id number]

[address] [place] [address]

                                         

 

                          [facility] [place]     [address] [id number]

[address] [place] [address]

                                         

 

                          [facility] [place]     [address] [id number]

[address] [place] [address]

                                         

 

                          [facility] [place]     [address] [id number]

[address] [place] [phone]

                          13 Mar, 2012               

 

                          [facility] [place]     [address] [id number]

[address] [place] [address]

                          13 Mar, 2012               

 

                          [facility] [place]     [address] [id number]

[address] [place] [phone]

                          31 Dec, 2011               

 

                          [facility] [place]     [address] [id number]

[address] [place] [address]

                                         

 

                          [facility] [place]     [address] [id number]

[address] [place] [address]

                          15 Apr, 2010               

 

                          [facility] [place]     [address] [id number]

[address] [place] [address]

                          11 Mar, 2010               

 

                          [facility] [place]     [address] [id number]

[address] [place] [address]

                                         

 

                          [facility] [place]     [address] [id number]

[address] [place] [address]

                          23 Dec, 2009               

 

                          [facility] [place]     [address] [id number]

[address] [place] [address]

                          03 Dec, 2009               







IMMUNIZATIONS

No Known Immunizations



SOCIAL HISTORY

Never Assessed



REASON FOR VISIT

[facility]



PLAN OF CARE





VITAL SIGNS





MEDICATIONS

Unknown Medications



RESULTS

No Results



PROCEDURES

No Known procedures



INSTRUCTIONS





MEDICATIONS ADMINISTERED

No Known Medications



MEDICAL (GENERAL) HISTORY





                    Type                Description         Date

 

                    Medical History     acid reflux          

 

                    Medical History     asthma               

 

                    Surgical History     section x2

## 2020-07-20 NOTE — XMS REPORT
[facility]

                             Created on: 2020



Roseline Dickens

External Reference #: [id number]

: 1990

Sex: Female



Demographics





                          Address                   [address]  [address]

 

                          Preferred Language        Unknown

 

                          Marital Status            Unknown

 

                          Sabianism Affiliation     Unknown

 

                          Race                      Unknown

 

                          Ethnic Group              Unknown





Author





                          Author                    Roseline Ramirez

 

                          Organization              [facility]

 

                          Address                   [address] [place]  [address]



 

                          Phone                     [phone]







Care Team Providers





                    Care Team Member Name Role                Phone

 

                    CRISTINE Ramirez   Unavailable         [phone]







PROBLEMS





          Type      Condition ICD9-CM Code KQN68-DL Code Onset Dates Condition S

tatus SNOMED 

Code

 

          Problem   Abdominal pain, right upper quadrant 789.01                 

       Active    [id number]

 

           Problem    Encounter for long-term (current) use of other medications

 V58.69                           

Active                                  [id number]

 

          Problem   Cough     786.2                         Active    61556866

 

          Problem   Chest pain, unspecified 786.50                        Active

    92534715

 

          Problem   Unspecified backache 724.5                         Active   

 443230885

 

          Problem   Esophageal reflux 530.81                        Active    24

0206766

 

          Problem   Acute bronchitis 466.0                         Active    105

32954

 

          Problem   Encounter for smoking cessation counseling V65.42           

             Active    36063987

 

          Problem   Sebaceous cyst 706.2                         Active    13194

3000

 

          Problem   Fatigue   780.79                        Active    56013726

 

          Problem   Unspecified disorder of skin and subcutaneous tissue 709.9  

                       Active    

36936143

 

          Problem   Allergic rhinitis, cause unspecified 477.9                  

       Active    01211467

 

          Problem   Anxiety state, unspecified 300.00                        Act

kishor    312838096

 

          Problem   Other acariasis 133.8                         Active    1872

14649

 

          Problem   Contact dermatitis 692.9                         Active    4

[id number]







ALLERGIES

No Information



ENCOUNTERS





                Encounter       Location        Date            Diagnosis

 

                          [facility]     [address] [id number]

[address] [place] [address]

                          10 Oct, 2018              Encounter for immunization Z

23

 

                          [facility]     [address] [id number]

[address] [place] [address]

                          05 Oct, 2017              Encounter for immunization Z

23

 

                          [facility]  [address] [id number]

[address] [place] [address] 

[address]                              Other viral agents as the ca

use of diseases classified 

elsewhere B97.89 ; Acute upper respiratory infection, unspecified J06.9 and Body
aches R52

 

                          [facility]  [address] [id number]

[address] [place] [address] 

[id number]                05 2016              Sore throat J02.9

 

                          [facility]     [address] [id number]

[address] [place] [address]

                          08 Mar, 2016               

 

                          [facility]  [address] 

[address]                07 Mar, 2016              Contact dermatitis L25.9

 

                          [facility]     [address] [place] [id number] [place] [address]

                                        Contact dermatitis, unspecif

ied contact dermatitis type, 

unspecified trigger L25.9

 

                          [facility]  [address] 

[address]                              Wrist injury, right, initial

 encounter S69.91XA and 

Right wrist pain M25.531

 

                          [place] [address] [address]

                          14 Sep, 2015              Fatigue 780.79 and Encounter

 for smoking cessation counseling 

V65.42

 

                          [place] [address] [address]

                          27 Aug, 2015              Contact dermatitis 692.9

 

                          [place] [address] [address]

                          13 Aug, 2015              Other acariasis 133.8

 

                          [place] [address] [address]

                          14 2015               

 

                          [place] [address] [address]

                                         

 

                          [place] [address] [address]

                                         

 

                          [place] [address] [address]

                                         

 

                          [place] [address] [address]

                                         

 

                          [facility]     [address] [id number]

[address] [place] [address], [place] [address]

                          15 Mar, 2014               

 

                          [facility]     [address] [id number]

[address] [place] [address], [place] [address]

                          15 Mar, 2014               

 

                          [facility]     [address] [id number]

[address] [place] [address], [place] [address]

                          14 2014               

 

                          [facility]     [address] [id number]

[address] [place] [address], [place] [address]

                                         

 

                          [facility]     [address] [id number]

[address] [place] [address], [place] [address]

                          24 Dec, 2013               

 

                          [facility]     [address] [id number]

[address] [place] [address], [place] [address]

                          24 Dec, 2013               

 

                          [facility]     [address] [id number]

[address] [place] [address], [place] [address]

                                         

 

                          [facility]     [address] [id number]

[address] [place] [address], [place] [address]

                                         

 

                          [facility]     [address] [id number]

[address] [place] [address], [place] [address]

                          07 Oct, 2013               

 

                          [facility]     [address] [id number]

[address] [place] [address], [place] [address]

                          04 Oct, 2013               

 

                          [facility]     [address] [id number]

[address] [place] [address], [place] [address]

                          23 Sep, 2013               

 

                          [facility]     [address] [id number]

[address] [place] [address]

                          18 Sep, 2013               

 

                          [facility]     [address] [id number]

[address] [place] [address], [place] [address]

                                         

 

                          [facility]     [address] [id number]

[address] [place] [address], [place] [address]

                          11 Oct, 2012               

 

                          [facility]     [address] [id number]

[address] [place] [address], [place] [address]

                          11 Oct, 2012               

 

                          [facility]     [address] [id number]

[address] [place] [address], [place] [address]

                          27 Sep, 2012               

 

                          [facility]     [address] [id number]

[address] [place] [address], [place] [address]

                          25 Sep, 2012               

 

                          [facility]     [address] [id number]

[address] [place] [address], [place] [address]

                          29 Aug, 2012               

 

                          [facility]     [address] [id number]

[address] [place] [address], [place] [address]

                          11 May, 2012               

 

                          [facility]     [address] [id number]

[address] [place] [address], [place] [address]

                          13 2012               

 

                          [facility]     [address] [id number]

[address] [place] [address], [place] [address]

                          11 2012               

 

                          [facility]     [address] [id number]

[address] [place] [address], [place] [address]

                          03 2012               

 

                          [facility]     [address] [id number]

[address] [place] [address], [place] [address]

                          13 Mar, 2012               

 

                          [facility]     [address] [id number]

[address] [place] [address], [place] [address]

                          13 Mar, 2012               

 

                          [facility]     [address] [id number]

[address] [place] [address], [place] [address]

                          31 Dec, 2011               

 

                          [facility]     [address] [id number]

[address] [place] [address], [place] [address]

                          29 2010               

 

                          [facility]     [address] [id number]

[address] [place] [address], [place] [address]

                          15 2010               

 

                          [facility]     [address] [id number]

[address] [place] [address]

                          11 Mar, 2010               

 

                          [facility]     [address] [id number]

[address] [place] [address]

                          13 2010               

 

                          [facility]     [address] [id number]

[address] [place] [address]

                          23 Dec, 2009               

 

                          [facility]     [address] [id number]

[address] [place] [address]

                          03 Dec, 2009               







IMMUNIZATIONS

No Known Immunizations



SOCIAL HISTORY

Never Assessed



REASON FOR VISIT





PLAN OF CARE





VITAL SIGNS





                    Height              65 in               2013

 

                    Weight              166.16 lbs          2013

 

                    Temperature         97.2 degrees Fahrenheit 2013

 

                    Heart Rate          80 bpm              2013

 

                    Respiratory Rate    18                  2013

 

                    Blood pressure systolic 108 mmHg            2013

 

                    Blood pressure diastolic 72 mmHg             2013







MEDICATIONS

Unknown Medications



RESULTS

No Results



PROCEDURES

No Known procedures



INSTRUCTIONS





MEDICATIONS ADMINISTERED

No Known Medications



MEDICAL (GENERAL) HISTORY





                    Type                Description         Date

 

                    Medical History     acid reflux          

 

                    Medical History     asthma               

 

                    Surgical History     section x2

## 2020-07-20 NOTE — XMS REPORT
[facility]

                             Created on: 2020



Roseline Dickens

External Reference #: [id number]

: 1990

Sex: Female



Demographics





                          Address                   [address]  [address]

 

                          Preferred Language        Unknown

 

                          Marital Status            Unknown

 

                          Cheondoism Affiliation     Unknown

 

                          Race                      Unknown

 

                          Ethnic Group              Unknown





Author





                          Author                    Roseline COLON

 

                          Organization              [facility]

 

                          Address                   [address] [place]  [address]



 

                          Phone                     [phone]







Care Team Providers





                    Care Team Member Name Role                Phone

 

                    KING COLON    Unavailable         [phone]







PROBLEMS





          Type      Condition ICD9-CM Code OFW77-RP Code Onset Dates Condition S

tatus SNOMED 

Code

 

          Problem   Abdominal pain, right upper quadrant 789.01                 

       Active    024254411

 

           Problem    Encounter for long-term (current) use of other medications

 V58.69                           

Active                                  [id number]

 

          Problem   Cough     786.2                         Active    04841160

 

          Problem   Chest pain, unspecified 786.50                        Active

    63799030

 

          Problem   Unspecified backache 724.5                         Active   

 409802370

 

          Problem   Esophageal reflux 530.81                        Active    24

4834912

 

          Problem   Acute bronchitis 466.0                         Active    105

34731

 

          Problem   Encounter for smoking cessation counseling V65.42           

             Active    04977315

 

          Problem   Sebaceous cyst 706.2                         Active    53279

3000

 

          Problem   Fatigue   780.79                        Active    34739543

 

          Problem   Unspecified disorder of skin and subcutaneous tissue 709.9  

                       Active    

69580795

 

          Problem   Allergic rhinitis, cause unspecified 477.9                  

       Active    43757650

 

          Problem   Anxiety state, unspecified 300.00                        Act

kishor    916921866

 

          Problem   Other acariasis 133.8                         Active    1872

38173

 

          Problem   Contact dermatitis 692.9                         Active    4

1615601







ALLERGIES

No Information



ENCOUNTERS





                Encounter       Location        Date            Diagnosis

 

                    [facility] [address] [id number] [place] [address] 10 

Oct, 2018                               Encounter for immunization Z23

 

                    [facility] [address] [facility] [id number] [place] [address] 05 

Oct, 2017                               Encounter for immunization Z23

 

                          [facility]  [address] [facility] [id number]

[address] [place] 

[address]                17 2017              Other viral agents as the ca

use of diseases classified 

elsewhere B97.89 ; Acute upper respiratory infection, unspecified J06.9 and Body
aches R52

 

                          [facility]  [address] [facility] [id number]

[address] [place] [address] 

[address]                05 2016              Sore throat J02.9

 

                    [facility] [id number] [place] [address] 08 

Mar, 2016                                

 

                          [facility]  [address] [facility] [id number]

[address] [place] [address] 

[address]                07 Mar, 2016              Contact dermatitis L25.9

 

                    [facility] [address] [address]                                Contact dermatitis, unspecified contact 

dermatitis type, unspecified 

trigger L25.9

 

                          [facility]  [address] [facility] [id number]

[address] [place] [address] 

[address]                              Wrist injury, right, initial

 encounter S69.91XA and 

Right wrist pain M25.531

 

                    [place] [address] [address] 14 

Sep, 2015                               Fatigue 780.79 and Encounter for smoking

 cessation counseling V65.42

 

                    [place] [address] [address] 27 

Aug, 2015                               Contact dermatitis 692.9

 

                    [place] [address] [address] 13 

Aug, 2015                               Other acariasis 133.8

 

                    [place] [address] [address] 14 

2015                                

 

                    [place] [address] [address]                                 

 

                    [place] [address] [address] 18 

2014                                

 

                    [place] [address] [address] 18 

2014                                

 

                    [place] [address] [address]                                 

 

                    [place] [address] [address] 15 

Mar, 2014                                

 

                    [facility] [address] [id number] [place] [address] 15 

Mar, 2014                                

 

                    [facility] [address] [id number] [place] [address] 14 

2014                                

 

                    [facility] [address] [id number] [place] [address] 14 

2014                                

 

                    [facility] [address] [id number] [place] [address] 24 

Dec, 2013                                

 

                    [facility] [address] [id number] [place] [address] 24 

Dec, 2013                                

 

                    [facility] [address] [id number] [place] [address]                                 

 

                    [facility] [address] [id number] [place] [address]                                 

 

                    [facility] [address] [id number] [place] [address] 07 

Oct, 2013                                

 

                    [facility] [address] [id number] [place] [address] 04 

Oct, 2013                                

 

                    [facility] [address] [id number] [place] [address] 23 

Sep, 2013                                

 

                    [facility] [address] [id number] [place] [address] 18 

Sep, 2013                                

 

                    [facility] [address] [id number] [place] [address]                                 

 

                    [facility] [address] [id number] [place] [address] 11 

Oct, 2012                                

 

                    [facility] [address] [id number] [place] [address] 11 

Oct, 2012                                

 

                    [facility] [address] [id number] [place] [address] 27 

Sep, 2012                                

 

                    [facility] [address] [id number] [place] [address] 25 

Sep, 2012                                

 

                    [facility] [address] [id number] [place] [address] 29 

Aug, 2012                                

 

                    [facility] [id number] [place] [address] 11 

May, 2012                                

 

                    [facility] [id number] [place] [address]                                 

 

                    [facility] [id number] [place] [address]                                 

 

                    [facility] [id number] [place] [address]                                 

 

                    [facility] [id number] [place] [address] 13 

Mar, 2012                                

 

                    [facility] [id number] [place] [address] 13 

Mar, 2012                                

 

                    [facility] [id number] [place] [address] 31 

Dec, 2011                                

 

                    [facility] [id number] [place] [address]                                 

 

                    [facility] [id number] [place] [address] 15 

Apr, 2010                                

 

                    [facility] [id number] [place] [address] 11 

Mar, 2010                                

 

                    [facility] [id number] [place] [address]                                 

 

                    [facility] [id number] [place] [address] 23 

Dec, 2009                                

 

                    [facility] [address] [id number] [place] [address] 03 

Dec, 2009                                







IMMUNIZATIONS

No Known Immunizations



SOCIAL HISTORY

Never Assessed



REASON FOR VISIT





PLAN OF CARE





VITAL SIGNS





                    Height              65 in               2014-03-15

 

                    Weight              176.1 lbs           2014-03-15

 

                    Temperature         101.3 degrees Fahrenheit 2014-03-15

 

                    Heart Rate          76 bpm              2014-03-15

 

                    Respiratory Rate    18                  2014-03-15

 

                    Blood pressure systolic 100 mmHg            2014-03-15

 

                    Blood pressure diastolic 68 mmHg             2014-03-15







MEDICATIONS

Unknown Medications



RESULTS

No Results



PROCEDURES





                Procedure       Date Ordered    Result          Body Site

 

                STREP A ASSAY W/OPTIC March 15, 2014                   







INSTRUCTIONS





MEDICATIONS ADMINISTERED

No Known Medications



MEDICAL (GENERAL) HISTORY





                    Type                Description         Date

 

                    Medical History     acid reflux          

 

                    Medical History     asthma               

 

                    Surgical History     section x2

## 2020-07-20 NOTE — XMS REPORT
[facility]

                             Created on: 07/10/2020



Roseline Dickens

External Reference #: [id number]

: 1990

Sex: Female



Demographics





                          Address                   [address]  [address]

 

                          Preferred Language        Unknown

 

                          Marital Status            Unknown

 

                          Jehovah's witness Affiliation     Unknown

 

                          Race                      Unknown

 

                          Ethnic Group              Unknown





Author





                          Author                    Roseline COLON

 

                          Organization              [facility]

 

                          Address                   [address] [place]  [address]



 

                          Phone                     [phone]







Care Team Providers





                    Care Team Member Name Role                Phone

 

                    KING COLON    Unavailable         [phone]







PROBLEMS





          Type      Condition ICD9-CM Code HHJ89-DZ Code Onset Dates Condition S

tatus SNOMED 

Code

 

          Problem   Abdominal pain, right upper quadrant 789.01                 

       Active    [id number]

 

           Problem    Encounter for long-term (current) use of other medications

 V58.69                           

Active                                  [id number]

 

          Problem   Cough     786.2                         Active    85421154

 

          Problem   Chest pain, unspecified 786.50                        Active

    87687328

 

          Problem   Unspecified backache 724.5                         Active   

 [id number]

 

          Problem   Esophageal reflux 530.81                        Active    24

1849027

 

          Problem   Acute bronchitis 466.0                         Active    105

74554

 

          Problem   Encounter for smoking cessation counseling V65.42           

             Active    90250137

 

          Problem   Sebaceous cyst 706.2                         Active    92186

3000

 

          Problem   Fatigue   780.79                        Active    24950654

 

          Problem   Unspecified disorder of skin and subcutaneous tissue 709.9  

                       Active    

79385056

 

          Problem   Allergic rhinitis, cause unspecified 477.9                  

       Active    12640823

 

          Problem   Anxiety state, unspecified 300.00                        Act

kishor    809104843

 

          Problem   Other acariasis 133.8                         Active    1872

81484

 

          Problem   Contact dermatitis 692.9                         Active    4

[id number]







ALLERGIES

No Information



ENCOUNTERS





                Encounter       Location        Date            Diagnosis

 

                          [facility]     [address] [facility] [id number]

[address] [place] [address]

                          10 Oct, 2018              Encounter for immunization Z

23

 

                          [facility]     [address] [facility] [id number]

[address] [place] [address]

                          05 Oct, 2017              Encounter for immunization Z

23

 

                          [facility]  [address] [facility] [id number]

[address] [place] [address] 

[address]                17 2017              Other viral agents as the ca

use of diseases classified 

elsewhere B97.89 ; Acute upper respiratory infection, unspecified J06.9 and Body
aches R52

 

                          [facility]  [address] [facility] [id number]

[address] [place] [address] 

[id number]                05 2016              Sore throat J02.9

 

                          [facility]     [address] [facility] [id number]

[address] [place] [address]

                          08 Mar, 2016               

 

                          [facility]  [address] [place] [id number] [place] [address] 

[address]                07 Mar, 2016              Contact dermatitis L25.9

 

                          [facility]     [address] [id number]

[address] [place] [address]

                                        Contact dermatitis, unspecif

ied contact dermatitis type, 

unspecified trigger L25.9

 

                          [facility]  [address] [facility] [id number] [place] [address] 

[address]                              Wrist injury, right, initial

 encounter S69.91XA and 

Right wrist pain M25.531

 

                          [place] [address] [address]

                          14 Sep, 2015              Fatigue 780.79 and Encounter

 for smoking cessation counseling 

V65.42

 

                          [place] [address] [address]

                          27 Aug, 2015              Contact dermatitis 692.9

 

                          [place] [address] [place] [id number]

[address] [place] [address]

                          13 Aug, 2015              Other acariasis 133.8

 

                          [place] [id number]

[address] [place] [address]

                          14 2015               

 

                          [place] [address] [place] [id number]

[address] [place] [address]

                                         

 

                          [place] [address] [place] [id number]

[address] [place] [address]

                                         

 

                          [facility]     [id number]

[address] [place] [address]

                                         

 

                          [place] [address] [place] [id number]

[address] [place] [address]

                          13 2014               

 

                          [facility]     [address] [id number]

[address] [place] [address], [place] [address]

                          15 Mar, 2014               

 

                          [facility]     [address] [id number]

[address] [place] [address], [place] [address]

                          15 Mar, 2014               

 

                          [facility]     [address] [id number]

[address] [place] [address], [place] [address]

                          14 2014               

 

                          [facility]     [address] [id number]

[address] [place] [address], [place] [address]

                                         

 

                          [facility]     [address] [id number]

[address] [place] [address], [place] [address]

                          24 Dec, 2013               

 

                          [facility]     [address] [id number]

[address] [place] [address], [place] [address]

                          24 Dec, 2013               

 

                          [facility]     [address] [id number]

[address] [place] [address], [place] [address]

                                         

 

                          [facility]     [address] [id number]

[address] [place] [address]

                                         

 

                          [facility]     [address] [id number]

[address] [place] [address], [place] [address]

                          07 Oct, 2013               

 

                          [facility]     [address] [id number]

[address] [place] [address]

                          04 Oct, 2013               

 

                          [facility]     [address] [id number]

[address] [place] [address]

                          23 Sep, 2013               

 

                          [facility]     [address] [id number]

[address] [place] [address]

                          18 Sep, 2013               

 

                          [facility]     [address] [id number]

[address] [place] [address], [place] [address]

                                         

 

                          [facility]     [address] [id number]

[address] [place] [address]

                          11 Oct, 2012               

 

                          [facility]     [address] [id number]

[address] [place] [address]

                          11 Oct, 2012               

 

                          [facility]     [address] [id number]

[address] [place] [address]

                          27 Sep, 2012               

 

                          [facility]     [address] [id number]

[address] [place] [address], [place] [address]

                          25 Sep, 2012               

 

                          [facility]     [address] [id number]

[address] [place] [address]

                          29 Aug, 2012               

 

                          [facility]     [address] [id number]

[address] [place] [address]

                          11 May, 2012               

 

                          [facility]     [address] [id number]

[address] [place] [address]

                          13 2012               

 

                          [facility]     [address] [id number]

[address] [place] [address]

                                         

 

                          [facility]     [address] [id number]

[address] [place] [address]

                                         

 

                          [facility]     [address] [id number]

[address] [place] [address]

                          13 Mar, 2012               

 

                          [facility]     [address] [id number]

[address] [place] [address]

                          13 Mar, 2012               

 

                          [facility]     [address] [id number]

[address] [place] [address]

                          31 Dec, 2011               

 

                          [facility]     [address] [id number]

[address] [place] [address]

                          29 2010               

 

                          [facility]     [address] [id number]

[address] [place] [address]

                          15 2010               

 

                          [facility]     [address] [id number]

[address] [place] [address]

                          11 Mar, 2010               

 

                          [facility]     [address] [id number]

[address] [place] [address]

                          13 2010               

 

                          [facility]     [address] [id number]

[address] [place] [address]

                          23 Dec, 2009               

 

                          [facility]     [address] [id number]

[address] [place] [address]

                          03 Dec, 2009               







IMMUNIZATIONS

No Known Immunizations



SOCIAL HISTORY

Never Assessed



REASON FOR VISIT





PLAN OF CARE





VITAL SIGNS





MEDICATIONS

Unknown Medications



RESULTS

No Results



PROCEDURES

No Known procedures



INSTRUCTIONS





MEDICATIONS ADMINISTERED

No Known Medications



MEDICAL (GENERAL) HISTORY





                    Type                Description         Date

 

                    Medical History     acid reflux          

 

                    Medical History     asthma               

 

                    Surgical History     section x2

## 2020-07-20 NOTE — OPERATIVE REPORT
DATE OF SERVICE:  



PREOPERATIVE DIAGNOSES:

1.  A 29-year-old female with chronic pelvic pain.

2.  History of endometriosis.

3.  Dysmenorrhea.



POSTOPERATIVE DIAGNOSES:

1.  A 29-year-old female with chronic pelvic pain.

2.  History of endometriosis.

3.  Dysmenorrhea.



PROCEDURE:

Robotic-assisted total laparoscopic hysterectomy with bilateral salpingectomy.



SURGEON:

Juan Carlos Mcdonnell DO



ASSISTANT:

Ghislaine Nichols DNP who was necessary for manipulation and retraction throughout

the procedure.



ANESTHESIA:

General endotracheal.



ESTIMATED BLOOD LOSS:

Minimal.



URINE OUTPUT:

30 mL clear at the end of the procedure.



FLUIDS:

1500 mL lactated Ringer's solution.



FINDINGS:

Grossly normal-appearing external female genitalia, grossly normal-appearing

bilateral ovaries, fallopian tubes, serosal surface of the uterus appears

grossly normal.



SPECIMEN SENT:

Uterus and bilateral fallopian tubes.



INDICATIONS FOR PROCEDURE:

This 29-year-old female is a patient who has been under my care for the greater

part of 6 years now.  Throughout her care with me in taking care with her last

pregnancy as well as dealing with this pelvic pain since then.  She

progressively had worsening pelvic pain with her periods and the chronic pelvic

pain at times has interrupted her job and has interrupted her intimacy with her

 and causing pain with intercourse.  She had undergone laparoscopy in the

past with some findings that were consistent with potential endometriosis.  We

had tried conservative measures in the past including birth control pills, IUD

placement, all of which have not done anything to help with pain with bleeding

that she has had.  She undergone a Depo-Provera at times as well, which did not

help at all.  We discussed Depo Lupron; however, the patient was wishing to

proceed with hysterectomy due to the bleeding and pain.  Risks of this procedure

were discussed with the patient including risk of bleeding, infection, damage to

surrounding structures including, but not limited to bowel, bladder, ureter,

kidneys, possible need for reoperation, postoperative complications that may

occur, possible need for reoperation, risk from anesthesia and even death. 

After everything was discussed with the patient in detail, consent was obtained

in the preoperative area with her  present.  All questions were answered

and the patient was taken to the operating room.



OPERATIVE REPORT IN DETAIL:

Once in the operating room, general anesthesia was found to be adequate.  She

was placed in dorsal lithotomy position, prepped and draped in normal sterile

fashion where time out was performed.  A Dupree catheter was placed using sterile

technique.  Weighted speculum was inserted to the patient's vagina.  Right angle

retractor was used to visualize the cervix, which was grasped at 12 o'clock

position using a single tooth tenaculum and an 0 Vicryl suture was then placed

through the anterior lip of the cervix and using my retraction point and the

tenaculum was removed.  I then gently sound the uterine cavity depth, which was

found to be 8 cm.  I selected an 8 cm Janice uterine manipulator tip and a 3.5 cm

colpotomy ring.  The manipulator tip was advanced into the uterus where the

balloon was deployed and the colpotomy ring was advanced around the vaginal

fornix after which all other instruments were removed from the patient's vagina.

 I performed a change of gloves, turned my attention to the abdomen where

infraumbilically I infiltrated this area using 0.25% Marcaine and make an 8 mm

incision with a knife and directed Veress needle through the incision until

intraperitoneal placement was confirmed using saline drop test.  An opening

pressure of 3 mmHg was noted.  I proceeded to max pressure of 15 mmHg, at which

point I removed the Veress needle and introduced an 8 mm blunt da Erwin camera

trocar into the incision until intraperitoneal placement was confirmed using the

da Erwin laparoscope.  I then had the patient placed in steep Trendelenburg. 

Initial brief scan of the upper abdominal anatomy and my insertion site appears

to have no damage and appears grossly normal.  Once in steep Trendelenburg, I am

able to visualize all my pelvic anatomy as defined in my findings above.  I

placed two lateral trocars approximately 8 cm lateral to my infraumbilical

trocar, these were 8 mm incision.  The skin was infiltrated using 0.25%

Marcaine.  Incisions were made with a knife and trocars were placed under direct

visualization of laparoscope.  Once these are in place, I brought in the da

Erwin robot and docked in appropriate fashion placing da Erwin vessel sealer in

the left hand and monopolar dany in the right hand.  I then took my place at

the [employer] operative console and performed the following dissection

bilaterally.  Starting at the uteroovarian ligament, I bipolar cauterized and

transected this using the vessel sealer.  I then created a window in the

mesosalpinx and took this laterally down the mesosalpinx amputating the

fallopian tube from its blood supply.  I then grasped the round ligament,

bipolar cauterized and transected using the vessel sealer.  I then grasped the

entire broad ligament, which I bipolar cauterized and transected using vessel

sealer down to the level of the lower uterine segment, at which point I

 the anterior and posterior leaflets of the broad ligament.  Anteriorly

leaflet was taken around to the anterior vaginal fornix.  The posterior leaflet

was taken around to the posterior vaginal fornix.  This allows me to skeletonize

the uterine vessels laterally, which I then bipolar cauterized and transected

using the vessel sealer.  I then created a colpotomy at 12 o'clock position

using a monopolar shear and took this circumferentially around the vaginal

fornix amputating the cervix from the vagina.  The entire specimen was then

removed through the vagina at that point.  I then reapproximated the lateral

vaginal apices of the vaginal cuff using 2-0 Vicryl suture in a figure-of-eight

fashion colposuspending them to the uterosacral ligament.  I then closed the

remainder of the vaginal cuff using a 2-0 V-Loc in a running fashion, after

which there was no active bleeding noted from any of my dissection planes.  I

then undocked the da Erwin robot and proceeded with remainder of the case

laparoscopically.  I copiously irrigated the pelvis using normal saline.  Once

again, there was no active bleeding noted from any of my dissection planes.  I

placed FloSeal hemostatic agent over all my planes of dissection and then

released insufflation and removed the lateral trocars under direct visualization

of laparoscope.  The infraumbilical trocar was left in place to release the

remainder of the insufflation and to introduce 10 mL of 0.25% Marcaine into the

peritoneal cavity for postoperative pain management.  The table was then

flattened out and the trocars were removed as well.  The skin reapproximated

using 4-0 Monocryl running subcuticular suture and interrupted subcuticular

stitches.  Dermabond was applied to the incisions and Band-Aids were placed over

these incisions as well.  Dupree catheter was left in place.  Lap and sponge

counts were correct at the end of the procedure.  Instrument counts correct as

well.  Two grams of Ancef, 500 mg of Flagyl were given preoperatively for 
infection

prophylaxis.





Job ID: [id number]

DocumentID: [id number]

Dictated Date:  07/20/2020 11:26:47

Transcription Date: 07/20/2020 19:13:23

Dictated By: DO SARANYA FAUST

## 2020-07-20 NOTE — NUR
ERIC BEAL admitted to room 3305-1 VIA PT BED FROM [facility] BY EVERETTE FIGUEROA RN AFTER A 
ROBOTIC ASSISTED TOTAL LAPAROSCOPIC HYSTERECTOMY AND BILATERAL SALPINGETOMY TODAY BY DR. BOND. ERIC BEAL introduced to surroundings, call light, bed controls, phone, TV, 
temperature control, lights, meal times, smoking policy, visitor policy, side rail policy, 
bathrooms and showers.  Patient Rights given to patient in the handbook.

## 2020-07-20 NOTE — XMS REPORT
Continuity of Care Document

                             Created on: 2020



Roseline Dickens

External Reference #: [id number]

: 1990

Sex: Female



Demographics





                          Address                   [address]

[place]  [id number]

 

                          Home Phone                [phone]

 

                          Preferred Language        Unknown

 

                          Marital Status            Unknown

 

                          Gnosticism Affiliation     Unknown

 

                          Race                      Unknown

 

                          Ethnic Group              Unknown





Author





                          [facility]                    [facility]

 

                          Organization              [facility]

 

                          Address                   Unknown

 

                          Phone                     Unavailable







Care Team Providers





                    Care Team Member Name Role                Phone

 

                    [facility]              Unavailable         Unavailable



                                        



Problems

                        



                                        No Data Provided for This Section       

             



                                                                        



Medications

                    



Combined list of all outpatient medications recorded within the last 15 months b
y all [facility] and [facility] facilities, and also all patien
t-reported medications.            



                    Medication                         Details                  

       Route        

                          Status                         Patient Instructions   

          

                          Prescription Expires                         Prescript

ion Number     

                          Last Dispense Date                         Ordering Pr

ovider 

                          Order Date                         Source             

   

   

 

                                        AMOXICILLIN-CLAVULANATE POTASS (amoxicil

daryn/potassium clavulanate), 875-125 MG, 

TABLET, ORAL, [place]ST[place]AL[place]/HIKMA, 20 ea. BOTTLE                                    

                                             Active                             

       

                                             [id number]                         

                          [facility],                         2020           

  

                                        [facility]

ty                    

 

                                        AMOXICILLIN-CLAVULANATE POTASS (amoxicil

daryn/potassium clavulanate), 875-125 MG, 

TABLET, ORAL, WEST-[place]ALES/HIKMA, 20 ea. BOTTLE                                    

                                             Active                             

       

                                             [id number]                         

                          [facility],                         2019           

  

                                        [facility]

ty                    

 

                                        CEPHALEXIN (CEPHALEXIN MONOHYDRATE), 500

MG, CAPSULE, ORAL, LUPIN PHARMACEU, 500 

ea. BOTTLE                                                                      

 

                    Active                                                      

                

                    [id number]                         2019                  

       [facility], 

                          2019                         Pharmacy Data 

Transaction Service Facility                    

 

                                        FLUZONE QUAD [id number] (influenza virus 

vaccine quadrival [phone](6 mos and 

up)/PF), 60MCG/.5ML, SYRINGE, INTRAMUSC, SANOFI-PASTEUR, .5 ml SYRINGE          
                                                                      Active    

      

                                                                      [id number]   

      

                          2019                         [facility],           

           

                          2019                         Pharmacy Data Trans

action Service Facility 

                  

 

                                        FLUZONE QUAD [id number] (influenza virus 

vaccine quadrival 4556-3119(6 mos and 

up)/PF), 60MCG/.5ML, SYRINGE, INTRAMUSC, SANOFI-PASTEUR, .5 ml SYRINGE          
                                                                      Active    

      

                                                                      [id number]   

      

                          2019                         [facility]           

           

                          [facility]                         Pharmacy Data Trans

action Service Facility 

                  

 

                                        MEDROXYPROGESTERONE ACETATE (MEDROXYPROG

ESTERONE ACET), 10MG, TABLET, ORAL, 

BARR, 100 ea. BOTTLE                                                            

 

                    Active                                                      

      

                          [id number]                         2020            

            

                    [facility],                         2020                  

       Pharmacy Data

Transaction Service Facility                    

 

                                        OSELTAMIVIR PHOSPHATE (oseltamivir phosp

hate), 75 MG, CAPSULE, ORAL, CAMBER 

PHARMACE, 10 ea. BLIST PACK                                                     

 

                     Active                                                     

                          [id number]                         2020            

     

                    [facility],                         2020                 

        

Pharmacy Data Transaction Service Facility                    

 

                                        SRONYX (levonorgestrel/ethinyl estradiol

), 0.1-0.02MG, TABLET, ORAL, MAYNE 

PHARMA IN, 28 ea. BLIST PACK                                                    

 

                      Active                                                    

                          [id number]                         2020            

    

                          [facility]     

                  

                                        [facility] Data Transaction Service Facili

ty                    

 

                                        SRONYX (levonorgestrel/ethinyl estradiol

), 0.1-0.02MG, TABLET, ORAL, MAYNE 

PHARMA IN, 28 ea. BLIST PACK                                                    

 

                      Active                                                    

                          [id number]                         2020            

    

                          [facility]                         2020     

                  

                                        Pharmacy Data Transaction Service Facili

ty                    

 

                                        SRONYX (levonorgestrel/ethinyl estradiol

), 0.1-0.02MG, TABLET, ORAL, MAYNE 

PHARMA IN, 28 ea. BLIST PACK                                                    

 

                      Active                                                    

                          [id number]                         08/15/2019            

    

                          [facility]                         10/08/2019     

                  

                                        Pharmacy Data Transaction Service Facili

ty                    

 

                                        SRONYX (levonorgestrel/ethinyl estradiol

), 0.1-0.02MG, TABLET, ORAL, MAYNE 

PHARMA IN, 28 ea. BLIST PACK                                                    

 

                      Active                                                    

                          [id number]                         2019            

    

                          [facility]                         2019     

                  

                                        [facility]armacy Data Transaction Service Facili

ty                    

 

                                        TRAMADOL HCL (TRAMADOL HCL), 50MG, TABLE

T, ORAL, TEVA [place], 500 ea. BOTTLE       

                                                                      Active    

   

                                                                      [id number]   

   

                          2020                         [facility],           

        

                          2020                         Pharmacy Data Trans

action Service 

Facility                    



                                                                                
                                                                                
                                                                                
                     



Allergies, Adverse Reactions, Alerts

                        



                                        No Known Medication Allergies           

         



                                                                        



Immunizations

                                



                                        No Data Provided for This Section       

                                 



                                                                     



Results





                                        No Data Provided for This Section



                                                



Vital Signs

                    



                                        No Data Provided for This Section       

         



                                                                        



Encounters

                    



                                        No Data Provided for This Section       

             



                                                                                
   



Procedures

        



                                        No Data Provided for This Section



                                                                



Social History

                    



Combined list of available smoking, tobacco, and other social history on record 
at Department of Defense and/or Veterans Affairs facilities. The included entrie
s comply with the patient's data sharing authorizations.            



                    Social History Type                         Response        

                 

Date                         Comment                         Source             

      

 

                          This section is an empty social history section.      

                          

                                                                       [facility]D      

             



                                                                    



Assessment and Plan

                    



                                        No Data Provided for This Section       

             



                                                                                
                                                           



Plan of Care

                                                



                                        No Data Provided for This Section       

             



                                                                            



Family History

                    



                                        No Data Provided for This Section       

             



                                                            



Advance Directives

                    



                                        No Data Provided for This Section       

             



                                                            



Functional Status

                    



                                        No Data Provided for This Section

## 2020-07-20 NOTE — XMS REPORT
[facility]

                             Created on: 07/10/2020



Roseline Dickens

External Reference #: [id number]

: 1990

Sex: Female



Demographics





                          Address                   [address]  [address]

 

                          Preferred Language        Unknown

 

                          Marital Status            Unknown

 

                          [employer] Affiliation     Unknown

 

                          Race                      Unknown

 

                          Ethnic Group              Unknown





Author





                          Author                    Roseline Ramirez

 

                          Organization              [facility]

 

                          Address                   [address] [place]  [address]



 

                          Phone                     [phone]







Care Team Providers





                    Care Team Member Name Role                Phone

 

                    CRISTINE Ramirez   Unavailable         [phone]







PROBLEMS





          Type      Condition ICD9-CM Code NDK78-SJ Code Onset Dates Condition S

tatus SNOMED 

Code

 

          Problem   Abdominal pain, right upper quadrant 789.01                 

       Active    344067260

 

           Problem    Encounter for long-term (current) use of other medications

 V58.69                           

Active                                  [id number]

 

          Problem   Cough     786.2                         Active    20762617

 

          Problem   Chest pain, unspecified 786.50                        Active

    73773302

 

          Problem   Unspecified backache 724.5                         Active   

 725090494

 

          Problem   Esophageal reflux 530.81                        Active    24

8250658

 

          Problem   Acute bronchitis 466.0                         Active    105

22173

 

          Problem   Encounter for smoking cessation counseling V65.42           

             Active    27244549

 

          Problem   Sebaceous cyst 706.2                         Active    84045

3000

 

          Problem   Fatigue   780.79                        Active    77707293

 

          Problem   Unspecified disorder of skin and subcutaneous tissue 709.9  

                       Active    

05308158

 

          Problem   Allergic rhinitis, cause unspecified 477.9                  

       Active    24152238

 

          Problem   Anxiety state, unspecified 300.00                        Act

kishor    245721139

 

          Problem   Other acariasis 133.8                         Active    1872

00805

 

          Problem   Contact dermatitis 692.9                         Active    4

[id number]







ALLERGIES

No Information



ENCOUNTERS





                Encounter       Location        Date            Diagnosis

 

                          [facility]     [address] [facility] [id number]

[address] [place] [address]

                          10 Oct, 2018              Encounter for immunization Z

23

 

                          [facility]     [address] [facility] [id number]

[address] [place] [address]

                          05 Oct, 2017              Encounter for immunization Z

23

 

                          [facility]  [address] [facility] [id number]

[address] [place] [address] 

[address]                17 2017              Other viral agents as the ca

use of diseases classified 

elsewhere B97.89 ; Acute upper respiratory infection, unspecified J06.9 and Body
aches R52

 

                          [facility]  [address] [facility] [id number]

[address] [place] [address] 

[address]                05 2016              Sore throat J02.9

 

                          [facility]     [address] [facility] [id number]

[address] [place] [address]

                          08 Mar, 2016               

 

                          [facility]  [address] 

[address]                07 Mar, 2016              Contact dermatitis L25.9

 

                          [facility]     [address] [place] [id number] [place] [address]

                                        Contact dermatitis, unspecif

ied contact dermatitis type, 

unspecified trigger L25.9

 

                          [facility]  [address] 

[address]                              Wrist injury, right, initial

 encounter S69.91XA and 

Right wrist pain M25.531

 

                          [place] [address] [address]

                          14 Sep, 2015              Fatigue 780.79 and Encounter

 for smoking cessation counseling 

V65.42

 

                          [place] [address] [address]

                          27 Aug, 2015              Contact dermatitis 692.9

 

                          [place] [address] [address]

                          13 Aug, 2015              Other acariasis 133.8

 

                          [place] [address] [address]

                          14 2015               

 

                          [place] [address] [address]

                                         

 

                          [place] [address] [address]

                                         

 

                          [place] [address] [address]

                                         

 

                          [place] [address] [address]

                                         

 

                          [facility]     [address] [id number]

[address] [place] [address], [place] [address]

                          15 Mar, 2014               

 

                          [facility]     [address] [id number]

[address] [place] [address], [place] [address]

                          15 Mar, 2014               

 

                          [facility]     [address] [id number]

[address] [place] [address], [place] [address]

                          14 2014               

 

                          [facility]     [address] [id number]

[address] [place] [address], [place] [address]

                                         

 

                          [facility]     [address] [id number]

[address] [place] [address], [place] [address]

                          24 Dec, 2013               

 

                          [facility]     [address] [id number]

[address] [place] [address], [place] [address]

                          24 Dec, 2013               

 

                          [facility]     [address] [id number]

[address] [place] [address], [place] [address]

                                         

 

                          [facility]     [address] [id number]

[address] [place] [address], [place] [address]

                                         

 

                          [facility]     [address] [id number]

[address] [place] [address], [place] [address]

                          07 Oct, 2013               

 

                          [facility]     [address] [id number]

[address] [place] [address], [place] [address]

                          04 Oct, 2013               

 

                          [facility]     [address] [id number]

[address] [place] [address], [place] [address]

                          23 Sep, 2013               

 

                          [facility]     [address] [id number]

[address] [place] [address]

                          18 Sep, 2013               

 

                          [facility]     [address] [id number]

[address] [place] [address], [place] [address]

                                         

 

                          [facility]     [address] [id number]

[address] [place] [address], [place] [address]

                          11 Oct, 2012               

 

                          [facility]     [address] [id number]

[address] [place] [address], [place] [address]

                          11 Oct, 2012               

 

                          [facility]     [address] [id number]

[address] [place] [address], [place] [address]

                          27 Sep, 2012               

 

                          [facility]     [address] [id number]

[address] [place] [address], [place] [address]

                          25 Sep, 2012               

 

                          [facility]     [address] [id number]

[address] [place] [address], [place] [address]

                          29 Aug, 2012               

 

                          [facility]     [address] [id number]

[address] [place] [address], [place] [address]

                          11 May, 2012               

 

                          [facility]     [address] [id number]

[address] [place] [address], [place] [address]

                          13 2012               

 

                          [facility]     [address] [id number]

[address] [place] [address], [place] [address]

                          11 2012               

 

                          [facility]     [address] [id number]

[address] [place] [address], [place] [address]

                          03 2012               

 

                          [facility]     [address] [id number]

[address] [place] [address], [place] [address]

                          13 Mar, 2012               

 

                          [facility]     [address] [id number]

[address] [place] [address], [place] [address]

                          13 Mar, 2012               

 

                          [facility]     [address] [id number]

[address] [place] [address], [place] [address]

                          31 Dec, 2011               

 

                          [facility]     [address] [id number]

[address] [place] [address], [place] [address]

                          29 2010               

 

                          [facility]     [address] [id number]

[address] [place] [address], [place] [address]

                          15 2010               

 

                          [facility]     [address] [id number]

[address] [place] [address]

                          11 Mar, 2010               

 

                          [facility]     [address] [id number]

[address] [place] [address]

                          13 2010               

 

                          [facility]     [address] [id number]

[address] [place] [address]

                          23 Dec, 2009               

 

                          [facility]     [address] [id number]

[address] [place] [address]

                          03 Dec, 2009               







IMMUNIZATIONS

No Known Immunizations



SOCIAL HISTORY

Never Assessed



REASON FOR VISIT





PLAN OF CARE





VITAL SIGNS





                    Height              65 in               2013

 

                    Weight              167.66 lbs          2013

 

                    Temperature         98.3 degrees Fahrenheit 2013

 

                    Heart Rate          72 bpm              2013

 

                    Respiratory Rate    20                  2013

 

                    Blood pressure systolic 108 mmHg            2013

 

                    Blood pressure diastolic 72 mmHg             2013







MEDICATIONS

Unknown Medications



RESULTS

No Results



PROCEDURES

No Known procedures



INSTRUCTIONS





MEDICATIONS ADMINISTERED

No Known Medications



MEDICAL (GENERAL) HISTORY





                    Type                Description         Date

 

                    Medical History     acid reflux          

 

                    Medical History     asthma               

 

                    Surgical History     section x2

## 2020-07-20 NOTE — XMS REPORT
[facility]

                             Created on: 2019



Roseline Dickens

External Reference #: [id number]

: 1990

Sex: Female



Demographics





                          Address                   [address]  [address]

 

                          Preferred Language        Unknown

 

                          Marital Status            Unknown

 

                          Episcopalian Affiliation     Unknown

 

                          Race                      Unknown

 

                          Ethnic Group              Unknown





Author





                          Author                    Roseline Castañeda Doctor

 

                          Organization              [facility]

 

                          Address                   Unknown

 

                          Phone                     Unavailable







Care Team Providers





                    Care Team Member Name Role                Phone

 

                    Migration,  Doctor  Unavailable         Unavailable







PROBLEMS





          Type      Condition ICD9-CM Code GYY13-RW Code Onset Dates Condition S

tatus SNOMED 

Code

 

          Problem   Abdominal pain, right upper quadrant 789.01                 

       Active    [id number]

 

           Problem    Encounter for long-term (current) use of other medications

 V58.69                           

Active                                  [id number]

 

          Problem   Cough     786.2                         Active    09927828

 

          Problem   Chest pain, unspecified 786.50                        Active

    78068969

 

          Problem   Unspecified backache 724.5                         Active   

 523990204

 

          Problem   Esophageal reflux 530.81                        Active    24

2829676

 

          Problem   Acute bronchitis 466.0                         Active    105

30332

 

          Problem   Encounter for smoking cessation counseling V65.42           

             Active    36088204

 

          Problem   Sebaceous cyst 706.2                         Active    64727

3000

 

          Problem   Fatigue   780.79                        Active    04767057

 

          Problem   Unspecified disorder of skin and subcutaneous tissue 709.9  

                       Active    

48481494

 

          Problem   Allergic rhinitis, cause unspecified 477.9                  

       Active    87895551

 

          Problem   Anxiety state, unspecified 300.00                        Act

kishor    485242519

 

          Problem   Other acariasis 133.8                         Active    1872

42297

 

          Problem   Contact dermatitis 692.9                         Active    4

[id number]







ALLERGIES

No Information



ENCOUNTERS





                Encounter       Location        Date            Diagnosis

 

                          [facility]     [address] [facility] [id number]

[address] [place] [address]

                          10 Oct, 2018              Encounter for immunization Z

23

 

                          [facility]     [address] [facility] [id number]

[address] [place] [address]

                          05 Oct, 2017              Encounter for immunization Z

23

 

                          [facility]  [address] [facility] [id number]

[address] [place] [address] 

[address]                              Other viral agents as the ca

use of diseases classified 

elsewhere B97.89 ; Acute upper respiratory infection, unspecified J06.9 and Body
aches R52

 

                          [facility]  [address] [facility] [id number]

[address] [place] [address] 

[address]                              Sore throat J02.9

 

                          [facility]     [address] [facility] [id number]

[address] [place] [address]

                          08 Mar, 2016               

 

                          [facility]  [address] [facility] [id number]

[address] [place] [address] 

[phone]                07 Mar, 2016              Contact dermatitis L25.9

 

                          [facility]     [address] [facility] [id number]

[address] [place] [address]

                                        Contact dermatitis, unspecif

ied contact dermatitis type, 

unspecified trigger L25.9

 

                          [facility]  [address] [facility] [id number]

[address] [place] [address] 

[address]                27 2016              Wrist injury, right, initial

 encounter S69.91XA and 

Right wrist pain M25.531

 

                          [facility]     [address] [facility] [id number]

[address] [place] [address]

                          14 Sep, 2015              Fatigue 780.79 and Encounter

 for smoking cessation counseling 

V65.42

 

                          [facility]     [address] [id number]

[address] [place] [phone]

                          27 Aug, 2015              Contact dermatitis 692.9

 

                          [facility]     [address] [address]

                          13 Aug, 2015              Other acariasis 133.8

 

                          [facility]     [address] [facility] [id number]

[address] [place] [phone]

                          14 2015               

 

                          [facility]     [address] [id number]

[address] [place] [address]

                                         

 

                          [facility]     [address] [facility] [id number]

[address] [place] [phone]

                          18 2014               

 

                          [facility]     [address] [id number]

[address] [place] [phone]

                          18 2014               

 

                          [facility]     [address] [facility] [id number]

[address] [place] [phone]

                          13 2014               

 

                          [facility]     [address] [id number]

[address] [place] [phone]

                          15 Mar, 2014               

 

                          [facility]     [address] [id number]

[address] [place] [address], [place] [address]

                          15 Mar, 2014               

 

                          [facility]     [address] [id number]

[address] [place] [address], [place] [address]

                          14 2014               

 

                          [facility]     [address] [id number]

[address] [place] [address], [place] [address]

                                         

 

                          [facility]     [address] [id number]

[address] [place] [address], [place] [address]

                          24 Dec, 2013               

 

                          [facility]     [address] [id number]

[address] [place] [address], [place] [address]

                          24 Dec, 2013               

 

                          [facility]     [address] [id number]

[address] [place] [address], [place] [address]

                                         

 

                          [facility]     [address] [id number]

[address] [place] [address], [place] [address]

                                         

 

                          [facility]     [address] [id number]

[address] [place] [address], [place] [address]

                          07 Oct, 2013               

 

                          [facility]     [address] [id number]

[address] [place] [address], [place] [address]

                          04 Oct, 2013               

 

                          [facility]     [address] [id number]

[address] [place] [address], [place] [address]

                          23 Sep, 2013               

 

                          [facility]     [address] [id number]

[address] [place] [address], [place] [address]

                          18 Sep, 2013               

 

                          [facility]     [address] [id number]

[address] [place] [phone]

                                         

 

                          [facility]     [address] [id number]

[address] [place] [address], [place] [address]

                          11 Oct, 2012               

 

                          [facility]     [address] [id number]

[address] [place] [address], [place] [address]

                          11 Oct, 2012               

 

                          [facility]     [address] [id number]

[address] [place] [address], [place] [address]

                          27 Sep, 2012               

 

                          [facility]     [address] [id number]

[address] [place] [phone]

                          25 Sep, 2012               

 

                          [facility]     [address] [id number]

[address] [place] [phone]

                          29 Aug, 2012               

 

                          [facility]     [address] [id number]

[address] [place] [phone]

                          11 May, 2012               

 

                          [facility]     [address] [id number]

[address] [place] [address]

                                         

 

                          [facility]     [address] [id number]

[address] [place] [address]

                                         

 

                          [facility]     [address] [id number]

[address] [place] [phone]

                                         

 

                          [facility]     [address] [id number]

[address] [place] [phone]

                          13 Mar, 2012               

 

                          [facility]     [address] [id number]

[address] [place] [phone]

                          13 Mar, 2012               

 

                          [facility]     [address] [id number]

[address] [place] [phone]

                          31 Dec, 2011               

 

                          [facility]     [address] [id number]

[address] [place] [phone]

                                         

 

                          [facility]     [address] [id number]

[address] [place] [address]

                          15 Apr, 2010               

 

                          [facility]     [address] [id number]

[address] [place] [phone]

                          11 Mar, 2010               

 

                          [facility]     [address] [id number]

[address] [place] [phone]

                                         

 

                          [facility]     [address] [id number]

[address] [place] [phone]

                          23 Dec, 2009               

 

                          [facility]     [address] [id number]

[address] [place] [phone]

                          03 Dec, 2009               







IMMUNIZATIONS

No Known Immunizations



SOCIAL HISTORY

Never Assessed



REASON FOR VISIT

[facility]-Facundo



PLAN OF CARE





VITAL SIGNS





MEDICATIONS





        Medication Instructions Dosage  Frequency Start Date End Date Duration S

cruzito

 

             cetirizine 10 mg              take 1 tablet (10 mg) by oral route o

nce daily              18 Sep, 2013

                                                            Active

 

                    Bactroban 2 %                           1 Application by Top

ical route 2 times per day for 7 days to 

affected area.              24 Dec, 2013                           Active

 

             Amoxicillin 500 mg              1 capsule by Oral route 3 times per

 day for 10 days              15 

Mar, 2014                                                   Active

 

                    Hydrocodone-Acetaminophen 5-325 mg                     1-2 t

ablet by Oral route every 6 hours PRN 

pain                      23 Sep, 2013                           Active

 

                    Albuterol Sulfate 90 mcg/actuation                     2 Puf

fs by Inhalation route every 4 hours 

Use with chamber                                         Active

 

                    Omeprazole 20 mg                        take 1 capsule (20 m

g) by oral route once daily before a meal

for 30                    18 Sep, 2013                           Active

 

                    Promethazine-Codeine 6.25-10 mg/5 mL                     5 m

L by Oral route every 4 hours for 7 

day(s)                    25 Sep, 2012                           Active

 

             Bactrim -160 mg              1 Tablet by Po route 2 times per

 day for 10 days              24 

Dec, 2013                                                   Active







RESULTS

No Results



PROCEDURES

No Known procedures



INSTRUCTIONS





MEDICATIONS ADMINISTERED

No Known Medications



MEDICAL (GENERAL) HISTORY





                    Type                Description         Date

 

                    Medical History     acid reflux          

 

                    Medical History     asthma               

 

                    Surgical History     section x2

## 2020-07-20 NOTE — XMS REPORT
[facility]

                             Created on: 07/10/2020



Roseline Dickens

External Reference #: [id number]

: 1990

Sex: Female



Demographics





                          Address                   [address]  [address]

 

                          Preferred Language        Unknown

 

                          Marital Status            Unknown

 

                          Moravian Affiliation     Unknown

 

                          Race                      Unknown

 

                          Ethnic Group              Unknown





Author





                          Author                    Roseline COLON

 

                          Organization              [facility]

 

                          Address                   [address] [place]  [address]



 

                          Phone                     [phone]







Care Team Providers





                    Care Team Member Name Role                Phone

 

                    KING COLON    Unavailable         [phone]







PROBLEMS





          Type      Condition ICD9-CM Code GYV09-GN Code Onset Dates Condition S

tatus SNOMED 

Code

 

          Problem   Abdominal pain, right upper quadrant 789.01                 

       Active    [id number]

 

           Problem    Encounter for long-term (current) use of other medications

 V58.69                           

Active                                  [id number]

 

          Problem   Cough     786.2                         Active    37643607

 

          Problem   Chest pain, unspecified 786.50                        Active

    11596097

 

          Problem   Unspecified backache 724.5                         Active   

 744495672

 

          Problem   Esophageal reflux 530.81                        Active    24

4491106

 

          Problem   Acute bronchitis 466.0                         Active    105

69590

 

          Problem   Encounter for smoking cessation counseling V65.42           

             Active    03605936

 

          Problem   Sebaceous cyst 706.2                         Active    04416

3000

 

          Problem   Fatigue   780.79                        Active    85695235

 

          Problem   Unspecified disorder of skin and subcutaneous tissue 709.9  

                       Active    

36478569

 

          Problem   Allergic rhinitis, cause unspecified 477.9                  

       Active    24784067

 

          Problem   Anxiety state, unspecified 300.00                        Act

kishor    694935623

 

          Problem   Other acariasis 133.8                         Active    1872

23973

 

          Problem   Contact dermatitis 692.9                         Active    4

[id number]







ALLERGIES

No Information



ENCOUNTERS





                Encounter       Location        Date            Diagnosis

 

                          [facility]     [address] [facility] [id number]

[address] [place] [address]

                          10 Oct, 2018              Encounter for immunization Z

23

 

                          [facility]     [address] [facility] [id number]

[address] [place] [address]

                          05 Oct, 2017              Encounter for immunization Z

23

 

                          [facility]  [address] [facility] [id number]

[address] [place] [address] 

[phone]                17 2017              Other viral agents as the ca

use of diseases classified 

elsewhere B97.89 ; Acute upper respiratory infection, unspecified J06.9 and Body
aches R52

 

                          [facility]  [address] [facility] [id number]

[address] [place] [address] 

[phone]                05 2016              Sore throat J02.9

 

                          [facility]     [address] [facility] [id number]

[address] [place] [address]

                          08 Mar, 2016               

 

                          [facility]  [id number] [place] [address] 

[phone]                07 Mar, 2016              Contact dermatitis L25.9

 

                          [facility]     [address] [id number]

[address] [place] [address]

                                        Contact dermatitis, unspecif

ied contact dermatitis type, 

unspecified trigger L25.9

 

                          [facility]  [address] [facility] [id number] [place] [address] 

[id number]                              Wrist injury, right, initial

 encounter S69.91XA and 

Right wrist pain M25.531

 

                          [place] [address] [address]

                          14 Sep, 2015              Fatigue 780.79 and Encounter

 for smoking cessation counseling 

V65.42

 

                          [place] [address] [phone]

                          27 Aug, 2015              Contact dermatitis 692.9

 

                          [place] [address] [id number]

[address] [place] [address]

                          13 Aug, 2015              Other acariasis 133.8

 

                          [place] [id number]

[address] [place] [address]

                          14 2015               

 

                          [place] [address] [id number]

[address] [place] [address]

                                         

 

                          [place] [address] [place] [id number]

[address] [place] [address]

                                         

 

                          [facility]     [address] [id number]

[address] [place] [address]

                                         

 

                          [place] [address] [place] [id number]

[address] [place] [address]

                          13 2014               

 

                          [facility]     [address] [id number]

[address] [place] [address], [place] [address]

                          15 Mar, 2014               

 

                          [facility]     [address] [id number]

[address] [place] [address], [place] [address]

                          15 Mar, 2014               

 

                          [facility]     [address] [id number]

[address] [place] [address], [place] [address]

                          14 2014               

 

                          [facility]     [address] [id number]

[address] [place] [address], [place] [address]

                                         

 

                          [facility]     [address] [id number]

[address] [place] [address], [place] [address]

                          24 Dec, 2013               

 

                          [facility]     [address] [id number]

[address] [place] [address], [place] [address]

                          24 Dec, 2013               

 

                          [facility]     [address] [id number]

[address] [place] [address], [place] [address]

                                         

 

                          [facility]     [address] [id number]

[address] [place] [phone]

                                         

 

                          [facility]     [address] [id number]

[address] [place] [address], [place] [address]

                          07 Oct, 2013               

 

                          [facility]     [address] [id number]

[address] [place] [address]

                          04 Oct, 2013               

 

                          [facility]     [address] [id number]

[address] [place] [address]

                          23 Sep, 2013               

 

                          [facility]     [address] [id number]

[address] [place] [address]

                          18 Sep, 2013               

 

                          [facility]     [address] [id number]

[address] [place] [address], [place] [address]

                                         

 

                          [facility]     [address] [id number]

[address] [place] [address]

                          11 Oct, 2012               

 

                          [facility]     [address] [id number]

[address] [place] [address]

                          11 Oct, 2012               

 

                          [facility]     [address] [id number]

[address] [place] [address]

                          27 Sep, 2012               

 

                          [facility] [place]     [address] [id number]

[address] [place] [address], [place] [address]

                          25 Sep, 2012               

 

                          [facility] [place]     [address] [id number]

[address] [place] [address]

                          29 Aug, 2012               

 

                          [facility] [place]     [address] [id number]

[address] [place] [address]

                          11 May, 2012               

 

                          [facility] [place]     [address] [id number]

[address] [place] [address]

                          13 2012               

 

                          [facility] [place]     [address] [id number]

[address] [place] [address]

                                         

 

                          [facility] [place]     [address] [id number]

[address] [place] [address]

                                         

 

                          [facility] [place]     [address] [id number]

[address] [place] [address]

                          13 Mar, 2012               

 

                          [facility] [place]     [address] [id number]

[address] [place] [address]

                          13 Mar, 2012               

 

                          [facility] [place]     [address] [id number]

[address] [place] [address]

                          31 Dec, 2011               

 

                          [facility] [place]     [address] [id number]

[address] [place] [address]

                          29 2010               

 

                          [facility] [place]     [address] [id number]

[address] [place] [address]

                          15 2010               

 

                          [facility] [place]     [address] [id number]

[address] [place] [address]

                          11 Mar, 2010               

 

                          [facility] [place]     [address] [id number]

[address] [place] [address]

                          13 2010               

 

                          [facility] [place]     [address] [id number]

[address] [place] [address]

                          23 Dec, 2009               

 

                          [facility] [place]     [address] [id number]

[address] [place] [address]

                          03 Dec, 2009               







IMMUNIZATIONS

No Known Immunizations



SOCIAL HISTORY

Never Assessed



REASON FOR VISIT





PLAN OF CARE





VITAL SIGNS





                    Height              115 in              2013

 

                    Weight              166.25 lbs          2013

 

                    Temperature         98.7 degrees Fahrenheit 2013

 

                    Heart Rate          86 bpm              2013

 

                    Respiratory Rate    16                  2013

 

                    Blood pressure systolic 116 mmHg            2013

 

                    Blood pressure diastolic 68 mmHg             2013







MEDICATIONS

Unknown Medications



RESULTS

No Results



PROCEDURES





                Procedure       Date Ordered    Result          Body Site

 

                HEPATOBILIARY IMAGING 2013                    







INSTRUCTIONS





MEDICATIONS ADMINISTERED

No Known Medications



MEDICAL (GENERAL) HISTORY





                    Type                Description         Date

 

                    Medical History     acid reflux          

 

                    Medical History     asthma               

 

                    Surgical History     section x2

## 2020-07-20 NOTE — PROGRESS NOTE-PRE OPERATIVE
Pre-Operative Progress Note


H&P Reviewed


The H&P was reviewed, patient examined and no changes noted.


Date Seen by Provider:  Jul 20, 2020


Time Seen by Provider:  08:15


Date H&P Reviewed:  Jul 20, 2020


Time H&P Reviewed:  08:15


Pre-Operative Diagnosis:  CPP, endometriosis











RAUL BOND DO            Jul 20, 2020 08:17

## 2020-07-20 NOTE — NUR
DISCHARGE INSTRUCTIONS REVIEWED WITH COPY TO PT. RX GIVEN FOR HYDROCODONE AND OTHERS CALLED 
TO [facility]. STATES UNDERSTANDING OF ALL INSTRUCTIONS AND NEED TO F/U AS 
SCHEDULED AND AS NEEDED.

## 2020-07-20 NOTE — DISCHARGE INST-WOMEN'S SERVICE
Discharge Inst-Women's Serv


Depart Medication/Instructions


New, Converted or Re-Newed RX:  RX on Chart


Problems Reviewed?:  Yes





Consults/Follow Up


Additional Follow Up:  Yes


Orders/Referrals


Dr. Mcdonnell in 7-10 days and in 8 weeks





Activity


Activity:  Activity as Tolerated


Driving Instructions:  No Driving for 1 Week


NO SMOKING:  NO SMOKING


Nothing Inside Vagina:  No Douching, No [place], No Tampons





Diet


Discharge Diet:  No Restrictions


Symptoms to Report to :  Bleeding Excessive, Pain Increased, Fever Over 101 

Degrees F, Vaginal Bleeding Increase, Questions/Concerns


For Any Problems or Questions:  Contact Your Physician





Skin/Wound Care


Infection Signs and Symptoms:  Increased Redness, Foul Odor of Wound, Increased 

Drainage, Skin Itchy or Has a Rash, Increased Swelling, Temperature Above 101  F


Operative Area Clean and Dry:  Keep Incision Clean/Dry


Stitches/Staples/Dermabond:  Dermabond, Care of Stitches


Bathing Instructions:  RAUL Osborne DO            Jul 20, 2020 08:30

## 2020-07-20 NOTE — XMS REPORT
[facility]

                             Created on: 2020



Roseline Dickens

External Reference #: [id number]

: 1990

Sex: Female



Demographics





                          Address                   [address]  [address]

 

                          Preferred Language        Unknown

 

                          Marital Status            Unknown

 

                          Druze Affiliation     Unknown

 

                          Race                      Unknown

 

                          Ethnic Group              Unknown





Author





                          Author                    Roseline Castañeda Doctor

 

                          Organization              [facility]

 

                          Address                   Unknown

 

                          Phone                     Unavailable







Care Team Providers





                    Care Team Member Name Role                Phone

 

                    Migration,  Doctor  Unavailable         Unavailable







PROBLEMS





          Type      Condition ICD9-CM Code STS85-YR Code Onset Dates Condition S

tatus SNOMED 

Code

 

          Problem   Abdominal pain, right upper quadrant 789.01                 

       Active    289842484

 

           Problem    Encounter for long-term (current) use of other medications

 V58.69                           

Active                                  [id number]

 

          Problem   Cough     786.2                         Active    55715445

 

          Problem   Chest pain, unspecified 786.50                        Active

    56261260

 

          Problem   Unspecified backache 724.5                         Active   

 436538736

 

          Problem   Esophageal reflux 530.81                        Active    24

6060786

 

          Problem   Acute bronchitis 466.0                         Active    105

26445

 

          Problem   Encounter for smoking cessation counseling V65.42           

             Active    83391536

 

          Problem   Sebaceous cyst 706.2                         Active    91056

3000

 

          Problem   Fatigue   780.79                        Active    42832263

 

          Problem   Unspecified disorder of skin and subcutaneous tissue 709.9  

                       Active    

79420981

 

          Problem   Allergic rhinitis, cause unspecified 477.9                  

       Active    30073893

 

          Problem   Anxiety state, unspecified 300.00                        Act

kishor    [id number]

 

          Problem   Other acariasis 133.8                         Active    1872

59445

 

          Problem   Contact dermatitis 692.9                         Active    4

6898433







ALLERGIES

No Information



ENCOUNTERS





                Encounter       Location        Date            Diagnosis

 

                          [facility]     [address] [id number]

[address] [place] [address]

                          10 Oct, 2018              Encounter for immunization Z

23

 

                          [facility]     [address] [facility] [id number]

[address] [place] [address]

                          05 Oct, 2017              Encounter for immunization Z

23

 

                          [facility]  [address] [facility] [address] [id number]

[address] [place] [address] 

[address]                              Other viral agents as the ca

use of diseases classified 

elsewhere B97.89 ; Acute upper respiratory infection, unspecified J06.9 and Body
aches R52

 

                          [facility]  [address] [facility] [id number]

[address] [place] [address] 

[address]                              Sore throat J02.9

 

                          [facility]     [address] [facility] [id number]

[address] [place] [address]

                          08 Mar, 2016               

 

                          [facility]  [address] [facility] [address] [id number]

[address] [place] [address] 

[address]                07 Mar, 2016              Contact dermatitis L25.9

 

                          [facility]     [address] [facility] [id number]

[address] [place] [address]

                                        Contact dermatitis, unspecif

ied contact dermatitis type, 

unspecified trigger L25.9

 

                          [facility]  [address] [facility] [id number]

[address] [place] [address] 

[address]                27 2016              Wrist injury, right, initial

 encounter S69.91XA and 

Right wrist pain M25.531

 

                          [facility]     [address] [facility] [id number]

[address] [place] [address]

                          14 Sep, 2015              Fatigue 780.79 and Encounter

 for smoking cessation counseling 

V65.42

 

                          [facility]     [address] [id number]

[address] [place] [address]

                          27 Aug, 2015              Contact dermatitis 692.9

 

                          [facility]     [address] [address]

                          13 Aug, 2015              Other acariasis 133.8

 

                          [facility]     [address] [facility] [id number]

[address] [place] [address]

                          14 2015               

 

                          [facility]     [address] [id number]

[address] [place] [address]

                                         

 

                          [facility]     [address] [facility] [id number]

[address] [place] [address]

                          18 2014               

 

                          [facility]     [address] [id number]

[address] [place] [address]

                          18 2014               

 

                          [facility]     [address] [facility] [id number]

[address] [place] [address]

                          13 2014               

 

                          [facility]     [address] [id number]

[address] [place] [address]

                          15 Mar, 2014               

 

                          [facility]     [address] [id number]

[address] [place] [address], [place] [address]

                          15 Mar, 2014               

 

                          [facility]     [address] [id number]

[address] [place] [address], [place] [address]

                          14 2014               

 

                          [facility]     [address] [id number]

[address] [place] [address], [place] [address]

                                         

 

                          [facility]     [address] [id number]

[address] [place] [address], [place] [address]

                          24 Dec, 2013               

 

                          [facility]     [address] [id number]

[address] [place] [address], [place] [address]

                          24 Dec, 2013               

 

                          [facility]     [address] [id number]

[address] [place] [address], [place] [address]

                                         

 

                          [facility]     [address] [id number]

[address] [place] [address], [place] [address]

                                         

 

                          [facility]     [address] [id number]

[address] [place] [address], [place] [address]

                          07 Oct, 2013               

 

                          [facility]     [address] [id number]

[address] [place] [address], [place] [address]

                          04 Oct, 2013               

 

                          [facility]     [address] [id number]

[address] [place] [address], [place] [address]

                          23 Sep, 2013               

 

                          [facility]     [address] [id number]

[address] [place] [address], [place] [address]

                          18 Sep, 2013               

 

                          [facility]     [address] [id number]

[address] [place] [address]

                                         

 

                          [facility]     [address] [id number]

[address] [place] [address], [place] [address]

                          11 Oct, 2012               

 

                          [facility]     [address] [id number]

[address] [place] [address], [place] [address]

                          11 Oct, 2012               

 

                          [facility]     [address] [id number]

[address] [place] [address], [place] [address]

                          27 Sep, 2012               

 

                          [facility]     [address] [id number]

[address] [place] [address]

                          25 Sep, 2012               

 

                          [facility]     [address] [id number]

[address] [place] [address]

                          29 Aug, 2012               

 

                          [facility]     [address] [id number]

[address] [place] [address]

                          11 May, 2012               

 

                          [facility]     [address] [id number]

[address] [place] [address]

                                         

 

                          [facility]     [address] [id number]

[address] [place] [address]

                                         

 

                          [facility]     [address] [id number]

[address] [place] [address]

                                         

 

                          [facility]     [address] [id number]

[address] [place] [address]

                          13 Mar, 2012               

 

                          [facility]     [address] [id number]

[address] [place] [address]

                          13 Mar, 2012               

 

                          [facility]     [address] [id number]

[address] [place] [address]

                          31 Dec, 2011               

 

                          [facility]     [address] [id number]

[address] [place] [address]

                                         

 

                          [facility]     [address] [id number]

[address] [place] [address]

                          15 Apr, 2010               

 

                          [facility]     [address] [id number]

[address] [place] [address]

                          11 Mar, 2010               

 

                          [facility]     [address] [id number]

[address] [place] [address]

                                         

 

                          [facility]     [address] [id number]

[address] [place] [address]

                          23 Dec, 2009               

 

                          [facility]     [address] [id number]

[address] [place] [address]

                          03 Dec, 2009               







IMMUNIZATIONS

No Known Immunizations



SOCIAL HISTORY

Never Assessed



REASON FOR VISIT





PLAN OF CARE





VITAL SIGNS





MEDICATIONS

Unknown Medications



RESULTS

No Results



PROCEDURES

No Known procedures



INSTRUCTIONS





MEDICATIONS ADMINISTERED

No Known Medications



MEDICAL (GENERAL) HISTORY





                    Type                Description         Date

 

                    Medical History     acid reflux          

 

                    Medical History     asthma               

 

                    Surgical History     section x2

## 2020-07-21 NOTE — ANESTHESIA-GENERAL POST-OP
General


Patient Condition


Mental Status/LOC:  Same as Preop


Cardiovascular:  Satisfactory


Nausea/Vomiting:  Absent


Respiratory:  Satisfactory


Pain:  Controlled


Complications:  Absent





Post Op Complications


Complications


None





Follow Up Care/Instructions


Patient Instructions


None needed.





Anesthesia/Patient Condition


Patient Condition


Patient is doing well, no complaints, stable vital signs, no apparent adverse 

anesthesia problems.   


No complications reported per nursing.











KENYA SALDIVAR CRNA              Jul 21, 2020 06:38

## 2021-07-15 ENCOUNTER — HOSPITAL ENCOUNTER (OUTPATIENT)
Dept: HOSPITAL 75 - RAD | Age: 31
End: 2021-07-15
Attending: FAMILY MEDICINE
Payer: OTHER GOVERNMENT

## 2021-07-15 DIAGNOSIS — M25.774: Primary | ICD-10-CM

## 2021-07-15 PROCEDURE — 73630 X-RAY EXAM OF FOOT: CPT

## 2021-07-15 NOTE — DIAGNOSTIC IMAGING REPORT
INDICATION: Right foot pain.



COMPARISON: None.



FINDINGS:



3 views of the right foot demonstrate no fracture or dislocation.

Articular surfaces are normal. There is an osteophyte projecting

off of the plantar surfaces of the calcaneus. There is no

radiopaque foreign body.



IMPRESSION: Calcaneal osteophytosis without fracture.



Dictated by: 



  Dictated on workstation # [id number]

## 2021-08-06 ENCOUNTER — HOSPITAL ENCOUNTER (OUTPATIENT)
Dept: HOSPITAL 75 - RAD | Age: 31
End: 2021-08-06
Attending: PODIATRIST
Payer: OTHER GOVERNMENT

## 2021-08-06 DIAGNOSIS — M76.821: Primary | ICD-10-CM

## 2021-08-06 DIAGNOSIS — M77.51: ICD-10-CM

## 2021-08-06 DIAGNOSIS — M65.871: ICD-10-CM

## 2021-08-06 DIAGNOSIS — S96.911A: ICD-10-CM
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
## 2021-08-06 PROCEDURE — 73721 MRI JNT OF LWR EXTRE W/O DYE: CPT

## 2021-08-06 NOTE — DIAGNOSTIC IMAGING REPORT
PROCEDURE: MRI right joint lower extremity without contrast.



TECHNIQUE: Multiplanar, multisequence non contrast-enhanced MRI

of the right lower extremity was accomplished.



INDICATION:  Pain in the heel posterior tibial tendinitis.

Bursitis along the calcaneus.



EXAMINATION: MRI of the right lower extremity 08/06/2021.



FINDINGS:



There is diffuse abnormal signal intensity within the plantar

aspect of the calcaneus extending into the adjacent plantar

calcaneal spur. There is slight thickening of the adjacent

plantar fascia with associated surrounding edema. There is no

discontinuity. There is adjacent edema within the surrounding

musculature.



There is fluid within the sheath surrounding the peroneal

tendons. The peroneus brevis tendon contains a short segment

longitudinal split tear below the level of the malleolus. No

discontinuity seen as it appears intact more distally with normal

attachment to the base of the 5th metatarsal. The peroneus longus

tendon is intact. The flexor tendons are intact. There is a fair

amount of fluid surrounding the flexor hallucis longus tendon

which could be due to a focal synovitis. The extensor tendons

unremarkable.



The anterior talofibular ligament is intact. The posterior

talofibular ligament intact. The anterior and posterior inferior

tibiofibular ligaments intact. The deltoid ligament is intact.



Talar dome and ankle mortise unremarkable.



IMPRESSION:

1. Findings along the calcaneus and adjacent plantar fascia

consistent with plantar fasciitis. No focal discontinuity is

appreciated.

2. Focal short segment longitudinal split tear of the peroneus

brevis tendon with surrounding changes of tenosynovitis. 

3. Focal synovitis is likely along the course of the flexor

hallucis longus tendon. Remaining structures unremarkable.



Dictated by: 



  Dictated on workstation # [id number]